# Patient Record
Sex: FEMALE | Race: WHITE | Employment: OTHER | URBAN - METROPOLITAN AREA
[De-identification: names, ages, dates, MRNs, and addresses within clinical notes are randomized per-mention and may not be internally consistent; named-entity substitution may affect disease eponyms.]

---

## 2018-11-27 ENCOUNTER — APPOINTMENT (OUTPATIENT)
Dept: GENERAL RADIOLOGY | Age: 63
DRG: 177 | End: 2018-11-27
Attending: EMERGENCY MEDICINE
Payer: MEDICARE

## 2018-11-27 ENCOUNTER — HOSPITAL ENCOUNTER (INPATIENT)
Age: 63
LOS: 9 days | Discharge: HOME | DRG: 177 | End: 2018-12-06
Attending: EMERGENCY MEDICINE | Admitting: FAMILY MEDICINE
Payer: MEDICARE

## 2018-11-27 DIAGNOSIS — F79 MENTAL RETARDATION: ICD-10-CM

## 2018-11-27 DIAGNOSIS — G80.8 DIPLEGIC CEREBRAL PALSY (HCC): ICD-10-CM

## 2018-11-27 DIAGNOSIS — J69.0 ASPIRATION PNEUMONIA OF RIGHT LOWER LOBE DUE TO VOMIT (HCC): Primary | ICD-10-CM

## 2018-11-27 DIAGNOSIS — R09.02 HYPOXIC: ICD-10-CM

## 2018-11-27 PROBLEM — G80.9 CEREBRAL PALSY (HCC): Status: ACTIVE | Noted: 2018-11-27

## 2018-11-27 LAB
ALBUMIN SERPL-MCNC: 3 G/DL (ref 3.4–5)
ALBUMIN/GLOB SERPL: 0.7 {RATIO} (ref 0.8–1.7)
ALP SERPL-CCNC: 85 U/L (ref 45–117)
ALT SERPL-CCNC: 35 U/L (ref 13–56)
ANION GAP SERPL CALC-SCNC: 12 MMOL/L (ref 3–18)
ANION GAP SERPL CALC-SCNC: 9 MMOL/L (ref 3–18)
APPEARANCE UR: CLEAR
ARTERIAL PATENCY WRIST A: YES
AST SERPL-CCNC: 41 U/L (ref 15–37)
BASE EXCESS BLD CALC-SCNC: 1 MMOL/L
BASOPHILS # BLD: 0 K/UL (ref 0–0.06)
BASOPHILS # BLD: 0 K/UL (ref 0–0.1)
BASOPHILS NFR BLD: 0 % (ref 0–2)
BASOPHILS NFR BLD: 0 % (ref 0–3)
BDY SITE: ABNORMAL
BILIRUB SERPL-MCNC: 0.1 MG/DL (ref 0.2–1)
BILIRUB UR QL: NEGATIVE
BUN SERPL-MCNC: 11 MG/DL (ref 7–18)
BUN SERPL-MCNC: 14 MG/DL (ref 7–18)
BUN/CREAT SERPL: 19 (ref 12–20)
BUN/CREAT SERPL: 23 (ref 12–20)
CALCIUM SERPL-MCNC: 9 MG/DL (ref 8.5–10.1)
CALCIUM SERPL-MCNC: 9.6 MG/DL (ref 8.5–10.1)
CHLORIDE SERPL-SCNC: 104 MMOL/L (ref 100–108)
CHLORIDE SERPL-SCNC: 104 MMOL/L (ref 100–108)
CO2 SERPL-SCNC: 25 MMOL/L (ref 21–32)
CO2 SERPL-SCNC: 27 MMOL/L (ref 21–32)
COLOR UR: YELLOW
CREAT SERPL-MCNC: 0.59 MG/DL (ref 0.6–1.3)
CREAT SERPL-MCNC: 0.61 MG/DL (ref 0.6–1.3)
DIFFERENTIAL METHOD BLD: ABNORMAL
DIFFERENTIAL METHOD BLD: ABNORMAL
EOSINOPHIL # BLD: 0 K/UL (ref 0–0.4)
EOSINOPHIL # BLD: 0.1 K/UL (ref 0–0.4)
EOSINOPHIL NFR BLD: 0 % (ref 0–5)
EOSINOPHIL NFR BLD: 1 % (ref 0–5)
ERYTHROCYTE [DISTWIDTH] IN BLOOD BY AUTOMATED COUNT: 14.6 % (ref 11.6–14.5)
ERYTHROCYTE [DISTWIDTH] IN BLOOD BY AUTOMATED COUNT: 14.6 % (ref 11.6–14.5)
GAS FLOW.O2 O2 DELIVERY SYS: ABNORMAL L/MIN
GLOBULIN SER CALC-MCNC: 4.1 G/DL (ref 2–4)
GLUCOSE SERPL-MCNC: 124 MG/DL (ref 74–99)
GLUCOSE SERPL-MCNC: 98 MG/DL (ref 74–99)
GLUCOSE UR STRIP.AUTO-MCNC: NEGATIVE MG/DL
HCO3 BLD-SCNC: 25.1 MMOL/L (ref 22–26)
HCT VFR BLD AUTO: 39.8 % (ref 35–45)
HCT VFR BLD AUTO: 42.8 % (ref 35–45)
HGB BLD-MCNC: 13.1 G/DL (ref 12–16)
HGB BLD-MCNC: 14.1 G/DL (ref 12–16)
HGB UR QL STRIP: NEGATIVE
KETONES UR QL STRIP.AUTO: ABNORMAL MG/DL
LACTATE BLD-SCNC: 4.54 MMOL/L (ref 0.4–2)
LACTATE BLD-SCNC: 4.57 MMOL/L (ref 0.4–2)
LACTATE BLD-SCNC: 5.48 MMOL/L (ref 0.4–2)
LEUKOCYTE ESTERASE UR QL STRIP.AUTO: NEGATIVE
LYMPHOCYTES # BLD: 2.3 K/UL (ref 0.8–3.5)
LYMPHOCYTES # BLD: 3.2 K/UL (ref 0.9–3.6)
LYMPHOCYTES NFR BLD: 17 % (ref 20–51)
LYMPHOCYTES NFR BLD: 26 % (ref 21–52)
MCH RBC QN AUTO: 30.5 PG (ref 24–34)
MCH RBC QN AUTO: 30.7 PG (ref 24–34)
MCHC RBC AUTO-ENTMCNC: 32.9 G/DL (ref 31–37)
MCHC RBC AUTO-ENTMCNC: 32.9 G/DL (ref 31–37)
MCV RBC AUTO: 92.8 FL (ref 74–97)
MCV RBC AUTO: 93.2 FL (ref 74–97)
MONOCYTES # BLD: 1.4 K/UL (ref 0.05–1.2)
MONOCYTES # BLD: 1.4 K/UL (ref 0–1)
MONOCYTES NFR BLD: 10 % (ref 2–9)
MONOCYTES NFR BLD: 12 % (ref 3–10)
NEUTS BAND NFR BLD MANUAL: 12 % (ref 0–5)
NEUTS SEG # BLD: 7.6 K/UL (ref 1.8–8)
NEUTS SEG # BLD: 9.8 K/UL (ref 1.8–8)
NEUTS SEG NFR BLD: 60 % (ref 42–75)
NEUTS SEG NFR BLD: 61 % (ref 40–73)
NITRITE UR QL STRIP.AUTO: NEGATIVE
O2/TOTAL GAS SETTING VFR VENT: 21 %
OTHER CELLS NFR BLD MANUAL: 1 %
PCO2 BLD: 38.7 MMHG (ref 35–45)
PH BLD: 7.42 [PH] (ref 7.35–7.45)
PH UR STRIP: 5 [PH] (ref 5–8)
PLATELET # BLD AUTO: 265 K/UL (ref 135–420)
PLATELET # BLD AUTO: 281 K/UL (ref 135–420)
PLATELET COMMENTS,PCOM: ABNORMAL
PMV BLD AUTO: 9 FL (ref 9.2–11.8)
PMV BLD AUTO: 9.2 FL (ref 9.2–11.8)
PO2 BLD: 66 MMHG (ref 80–100)
POTASSIUM SERPL-SCNC: 4.1 MMOL/L (ref 3.5–5.5)
POTASSIUM SERPL-SCNC: 4.3 MMOL/L (ref 3.5–5.5)
PROT SERPL-MCNC: 7.1 G/DL (ref 6.4–8.2)
PROT UR STRIP-MCNC: NEGATIVE MG/DL
RBC # BLD AUTO: 4.29 M/UL (ref 4.2–5.3)
RBC # BLD AUTO: 4.59 M/UL (ref 4.2–5.3)
RBC MORPH BLD: ABNORMAL
SAO2 % BLD: 93 % (ref 92–97)
SERVICE CMNT-IMP: ABNORMAL
SODIUM SERPL-SCNC: 140 MMOL/L (ref 136–145)
SODIUM SERPL-SCNC: 141 MMOL/L (ref 136–145)
SP GR UR REFRACTOMETRY: 1.02 (ref 1–1.03)
SPECIMEN TYPE: ABNORMAL
TOTAL RESP. RATE, ITRR: 18
UROBILINOGEN UR QL STRIP.AUTO: 0.2 EU/DL (ref 0.2–1)
WBC # BLD AUTO: 12.4 K/UL (ref 4.6–13.2)
WBC # BLD AUTO: 13.6 K/UL (ref 4.6–13.2)

## 2018-11-27 PROCEDURE — 83605 ASSAY OF LACTIC ACID: CPT

## 2018-11-27 PROCEDURE — 36600 WITHDRAWAL OF ARTERIAL BLOOD: CPT

## 2018-11-27 PROCEDURE — 87086 URINE CULTURE/COLONY COUNT: CPT

## 2018-11-27 PROCEDURE — 74011000250 HC RX REV CODE- 250: Performed by: EMERGENCY MEDICINE

## 2018-11-27 PROCEDURE — 94762 N-INVAS EAR/PLS OXIMTRY CONT: CPT

## 2018-11-27 PROCEDURE — 74011250637 HC RX REV CODE- 250/637: Performed by: STUDENT IN AN ORGANIZED HEALTH CARE EDUCATION/TRAINING PROGRAM

## 2018-11-27 PROCEDURE — 85025 COMPLETE CBC W/AUTO DIFF WBC: CPT

## 2018-11-27 PROCEDURE — 77010033678 HC OXYGEN DAILY

## 2018-11-27 PROCEDURE — 81003 URINALYSIS AUTO W/O SCOPE: CPT

## 2018-11-27 PROCEDURE — 99285 EMERGENCY DEPT VISIT HI MDM: CPT

## 2018-11-27 PROCEDURE — 87040 BLOOD CULTURE FOR BACTERIA: CPT

## 2018-11-27 PROCEDURE — 71045 X-RAY EXAM CHEST 1 VIEW: CPT

## 2018-11-27 PROCEDURE — 82803 BLOOD GASES ANY COMBINATION: CPT

## 2018-11-27 PROCEDURE — 74011000258 HC RX REV CODE- 258: Performed by: EMERGENCY MEDICINE

## 2018-11-27 PROCEDURE — 65270000029 HC RM PRIVATE

## 2018-11-27 PROCEDURE — 36415 COLL VENOUS BLD VENIPUNCTURE: CPT

## 2018-11-27 PROCEDURE — 74011250636 HC RX REV CODE- 250/636: Performed by: EMERGENCY MEDICINE

## 2018-11-27 PROCEDURE — 93005 ELECTROCARDIOGRAM TRACING: CPT

## 2018-11-27 PROCEDURE — 80053 COMPREHEN METABOLIC PANEL: CPT

## 2018-11-27 PROCEDURE — 92526 ORAL FUNCTION THERAPY: CPT

## 2018-11-27 PROCEDURE — 74011250636 HC RX REV CODE- 250/636: Performed by: STUDENT IN AN ORGANIZED HEALTH CARE EDUCATION/TRAINING PROGRAM

## 2018-11-27 PROCEDURE — 92610 EVALUATE SWALLOWING FUNCTION: CPT

## 2018-11-27 RX ORDER — METRONIDAZOLE 7.5 MG/G
GEL TOPICAL DAILY
Status: DISCONTINUED | OUTPATIENT
Start: 2018-11-28 | End: 2018-12-06 | Stop reason: HOSPADM

## 2018-11-27 RX ORDER — GABAPENTIN 600 MG/1
TABLET ORAL 4 TIMES DAILY
Status: ON HOLD | COMMUNITY
End: 2020-06-02 | Stop reason: SDUPTHER

## 2018-11-27 RX ORDER — METRONIDAZOLE 7.5 MG/G
GEL TOPICAL DAILY
Status: ON HOLD | COMMUNITY
End: 2020-06-02 | Stop reason: SDUPTHER

## 2018-11-27 RX ORDER — POLYETHYLENE GLYCOL 3350 17 G/17G
17 POWDER, FOR SOLUTION ORAL DAILY
Status: ON HOLD | COMMUNITY
End: 2020-06-02 | Stop reason: SDUPTHER

## 2018-11-27 RX ORDER — ENOXAPARIN SODIUM 100 MG/ML
40 INJECTION SUBCUTANEOUS EVERY 24 HOURS
Status: DISCONTINUED | OUTPATIENT
Start: 2018-11-27 | End: 2018-12-06 | Stop reason: HOSPADM

## 2018-11-27 RX ORDER — GABAPENTIN 300 MG/1
600 CAPSULE ORAL 4 TIMES DAILY
Status: DISCONTINUED | OUTPATIENT
Start: 2018-11-27 | End: 2018-11-28

## 2018-11-27 RX ORDER — DIVALPROEX SODIUM 125 MG/1
1000 CAPSULE, COATED PELLETS ORAL
Status: ON HOLD | COMMUNITY
End: 2020-06-02 | Stop reason: SDUPTHER

## 2018-11-27 RX ORDER — SODIUM CHLORIDE 9 MG/ML
75 INJECTION, SOLUTION INTRAVENOUS CONTINUOUS
Status: DISCONTINUED | OUTPATIENT
Start: 2018-11-27 | End: 2018-11-28

## 2018-11-27 RX ORDER — LOPERAMIDE HYDROCHLORIDE 2 MG/1
2 CAPSULE ORAL AS NEEDED
COMMUNITY
End: 2020-06-02

## 2018-11-27 RX ORDER — POLYETHYLENE GLYCOL 3350 17 G/17G
17 POWDER, FOR SOLUTION ORAL DAILY
Status: DISCONTINUED | OUTPATIENT
Start: 2018-11-28 | End: 2018-12-06 | Stop reason: HOSPADM

## 2018-11-27 RX ORDER — RISPERIDONE 2 MG/1
2 TABLET, FILM COATED ORAL
Status: ON HOLD | COMMUNITY
End: 2020-06-02 | Stop reason: SDUPTHER

## 2018-11-27 RX ORDER — RISPERIDONE 2 MG/1
2 TABLET, FILM COATED ORAL
Status: DISCONTINUED | OUTPATIENT
Start: 2018-11-27 | End: 2018-12-06 | Stop reason: HOSPADM

## 2018-11-27 RX ORDER — LOPERAMIDE HYDROCHLORIDE 2 MG/1
2 CAPSULE ORAL AS NEEDED
Status: DISCONTINUED | OUTPATIENT
Start: 2018-11-27 | End: 2018-12-06 | Stop reason: HOSPADM

## 2018-11-27 RX ORDER — LORATADINE 10 MG/1
10 TABLET ORAL DAILY
Status: DISCONTINUED | OUTPATIENT
Start: 2018-11-28 | End: 2018-12-06 | Stop reason: HOSPADM

## 2018-11-27 RX ORDER — DIVALPROEX SODIUM 125 MG/1
1000 CAPSULE, COATED PELLETS ORAL
Status: DISCONTINUED | OUTPATIENT
Start: 2018-11-27 | End: 2018-12-06 | Stop reason: HOSPADM

## 2018-11-27 RX ORDER — IBUPROFEN 200 MG
200 TABLET ORAL
COMMUNITY
End: 2020-06-02

## 2018-11-27 RX ORDER — SODIUM CHLORIDE 0.9 % (FLUSH) 0.9 %
5-10 SYRINGE (ML) INJECTION AS NEEDED
Status: DISCONTINUED | OUTPATIENT
Start: 2018-11-27 | End: 2018-12-06 | Stop reason: HOSPADM

## 2018-11-27 RX ORDER — IBUPROFEN 200 MG
200 TABLET ORAL
Status: DISCONTINUED | OUTPATIENT
Start: 2018-11-27 | End: 2018-12-06 | Stop reason: HOSPADM

## 2018-11-27 RX ORDER — BACLOFEN 10 MG/1
20 TABLET ORAL EVERY 8 HOURS
Status: DISCONTINUED | OUTPATIENT
Start: 2018-11-27 | End: 2018-12-06 | Stop reason: HOSPADM

## 2018-11-27 RX ORDER — LORAZEPAM 0.5 MG/1
0.5 TABLET ORAL 2 TIMES DAILY
Status: DISCONTINUED | OUTPATIENT
Start: 2018-11-27 | End: 2018-12-06 | Stop reason: HOSPADM

## 2018-11-27 RX ADMIN — GABAPENTIN 600 MG: 300 CAPSULE ORAL at 18:12

## 2018-11-27 RX ADMIN — BACLOFEN 20 MG: 10 TABLET ORAL at 18:12

## 2018-11-27 RX ADMIN — SODIUM CHLORIDE 600 MG: 900 INJECTION, SOLUTION INTRAVENOUS at 18:00

## 2018-11-27 RX ADMIN — BACLOFEN 20 MG: 10 TABLET ORAL at 23:04

## 2018-11-27 RX ADMIN — SODIUM CHLORIDE 1000 ML: 900 INJECTION, SOLUTION INTRAVENOUS at 09:00

## 2018-11-27 RX ADMIN — SODIUM CHLORIDE 75 ML/HR: 900 INJECTION, SOLUTION INTRAVENOUS at 21:44

## 2018-11-27 RX ADMIN — SODIUM CHLORIDE 1000 ML: 900 INJECTION, SOLUTION INTRAVENOUS at 09:15

## 2018-11-27 RX ADMIN — GABAPENTIN 600 MG: 300 CAPSULE ORAL at 23:04

## 2018-11-27 RX ADMIN — DIVALPROEX SODIUM 1000 MG: 125 CAPSULE, COATED PELLETS ORAL at 23:04

## 2018-11-27 RX ADMIN — LORAZEPAM 0.5 MG: 0.5 TABLET ORAL at 18:11

## 2018-11-27 RX ADMIN — ENOXAPARIN SODIUM 40 MG: 100 INJECTION SUBCUTANEOUS at 18:15

## 2018-11-27 RX ADMIN — SODIUM CHLORIDE 600 MG: 900 INJECTION, SOLUTION INTRAVENOUS at 09:14

## 2018-11-27 NOTE — ED TRIAGE NOTES
Patient brought in by medic from Group home for choking x this morning during administration of her morning medication. As per medic, group member staff stated that patient began choking and turning blue. Patient was suctioned by staff and was able to get some contents from medication and yogurt up. Upon medics arrival, patient's O2 saturation was 89% on RA. Patient received duo-neb x 1, albuterol x 1 both via mask. Patient's O2 saturation was at 97% on RA after receiving treatments. Patient looks more comfortable upon arrival, skin color pink.

## 2018-11-27 NOTE — H&P
Admission History and Physical 
[facility] Patient: Terence Bravo MRN: [id number]  [facility]: [id number] YOB: 1955  Age: 70 y.o. Sex: female DOA: 11/27/2018 HPI:  
 
Terence Bravo is a 70 y.o. female with PMH of Non-verbal, Diplegic Cerebral Palsy, Seizure Disorder, Intellectual Disability, Hypothyroidism, and Chronic Constipation, now presenting with complaint of choking. Patient's caregiver stated that after the patient ate her breakfast around 6am, she began choking when her home medications were being administered. During the episode, patient was noted to turn blue in the face. EMS was called and they noted an Sp02 in the 80's, so she was brought to the ED. In the ED, patient was found to have lactic acidosis, hypotensive with a low of 70/55, hypoxic with a low of 88%, and tachycardic to 103. She also had a CXR showing bibasilar opacities with possible small pleural effusions. They started her on Sepsis protocol by giving fluids and starting Clindamycin. She was also placed on 4LNC. Review of Systems: Unable to obtain given patient's chronic condition. Past Medical History:  
Diagnosis Date  Elevated blood sugar 10/29/2010  Epilepsy ([address]  Herpes simplex   
 mukesh oral   
 Mental retardation, severe (I.Q. 20-34)  Spastic diplegia ([address]  Speech impairment Past Surgical History:  
Procedure Laterality Date  HX HYSTERECTOMY    
 laproscopic and supracervical  
 HX SALPINGO-OOPHORECTOMY    
 bilateral  
 
 
No family history on file. Social History Socioeconomic History  Marital status: SINGLE Spouse name: Not on file  Number of children: Not on file  Years of education: Not on file  Highest education level: Not on file Allergies Allergen Reactions  Fenahistine Dm Unknown (comments) Per care giver Prior to Admission Medications Prescriptions Last Dose Informant Patient Reported? Taking? Estradiol (DIVIGEL) 0.5 (0.1) mg (%) GlPk   Yes No  
Sig: by TransDERmal route. Apply to inner thigh @@ bedtime HOMEOPATHIC DRUGS (OSTEO-KATIE PO)   Yes No  
Sig: Take 500 mg by mouth two (2) times a day. baclofen (LIORESAL) 20 mg tablet Unknown at Unknown time  No No  
Sig: Take 1 Tab by mouth every eight (8) hours. benztropine (COGENTIN) 1 mg tablet   Yes No  
Sig: Take 1 mg by mouth three (3) times daily. clonazepam (KLONOPIN) 1 mg tablet   Yes No  
Sig: Take 1 mg by mouth daily. At noon   
divalproex SR (DEPAKOTE) 125 mg tablet Unknown at Unknown time  Yes No  
Sig: Take  by mouth. Take 8 caps po @ bedtime. fluticasone (FLONASE) 50 mcg/Actuation nasal spray   Yes No  
Si Sprays by Nasal route daily. Administer to right and left nostril.   
loratadine (CLARITIN) 10 mg tablet   Yes No  
Sig: Take 10 mg by mouth daily. lorazepam (ATIVAN) 0.5 mg tablet   Yes No  
Sig: Take 0.5 mg by mouth two (2) times a day. Facility-Administered Medications: None Physical Exam:  
 
Patient Vitals for the past 24 hrs: 
 Temp Pulse Resp BP SpO2  
18 1500  86 24 114/40 98 % 18 1445  85 25 105/65 100 % 18 1430  83 21 113/90 100 % 18 1415  84 19 106/59 100 % 18 1400  96 19 99/64 99 % 18 1345  93 19 96/70 100 % 18 1330  94 18 98/55 99 % 18 1315  98 17 96/63 98 % 18 1300  97 20 103/43 98 % 18 1245  100 16 102/61 98 % 18 1230  97 14 (!) 70/55 98 % 18 1215  (!) 103 24 (!) 88/56 91 % 18 1200  85 15 124/59 99 % 18 1145  94 17 109/53 100 % 18 1130  88 16 117/64 100 % 18 1115  97 18 130/43 98 % 18 1100  95 22 101/89 98 % 18 1045  71 21 98/75 100 % 18 0945   17    
18 0934     90 % 18 0837 97 °F (36.1 °C)      
18 0830  91 22 114/57 94 % 11/27/18 0827  88 21 114/83 92 % 11/27/18 0815  94 19 114/63 93 % 11/27/18 0800  93 19 108/89 92 % 11/27/18 0745  (!) 101 23 127/82 (!) 88 % 11/27/18 0742     93 % Physical Exam:  
General:  Alert and Responsive and in No acute distress. Tongue thrusting, chronic. Unable to follow commands. HEENT: Conjunctiva pink, sclera anicteric. EOMI. Pharynx moist, nonerythematous. Moist mucous membranes. Thyroid not enlarged, no nodules. No cervical, supraclavicular, occipital or submandibular lymphadenopathy. No other gross abnormalities present. CV:  RRR, no murmurs. No visible pulsations or thrills. RESP:  Unlabored breathing. Lungs clear to auscultation. No wheeze, rales, or rhonchi. Equal expansion bilaterally. ABD:  Soft, nontender, mildly distended. No hepatosplenomegaly. No suprapubic tenderness. MS:  Contorted lower extremities bilaterally. Mild atrophy. Neuro:  Unable to assess given patient's condition. Ext:  No edema. 2+ radial and dp pulses bilaterally. Skin:  No rashes, lesions, or ulcers. Good turgor. IMAGING:  
 
Xr Chest [facility] Result Date: 11/27/2018 Impression: 1. Bibasilar opacities with possible small pleural effusions. Recent Results (from the past 12 hour(s)) CBC WITH AUTOMATED DIFF Collection Time: 11/27/18  7:38 AM  
Result Value Ref Range WBC 12.4 4.6 - 13.2 K/uL  
 RBC 4.59 4.20 - 5.30 M/uL  
 HGB 14.1 12.0 - 16.0 g/dL HCT 42.8 35.0 - 45.0 % MCV 93.2 74.0 - 97.0 FL  
 MCH 30.7 24.0 - 34.0 PG  
 MCHC 32.9 31.0 - 37.0 g/dL  
 RDW 14.6 (H) 11.6 - 14.5 % PLATELET 891 510 - 362 K/uL MPV 9.2 9.2 - 11.8 FL  
 NEUTROPHILS 61 40 - 73 % LYMPHOCYTES 26 21 - 52 % MONOCYTES 12 (H) 3 - 10 % EOSINOPHILS 1 0 - 5 % BASOPHILS 0 0 - 2 %  
 ABS. NEUTROPHILS 7.6 1.8 - 8.0 K/UL  
 ABS. LYMPHOCYTES 3.2 0.9 - 3.6 K/UL  
 ABS. MONOCYTES 1.4 (H) 0.05 - 1.2 K/UL  
 ABS. EOSINOPHILS 0.1 0.0 - 0.4 K/UL ABS. BASOPHILS 0.0 0.0 - 0.1 K/UL  
 DF AUTOMATED METABOLIC PANEL, COMPREHENSIVE Collection Time: 11/27/18  7:38 AM  
Result Value Ref Range Sodium 141 136 - 145 mmol/L Potassium 4.1 3.5 - 5.5 mmol/L Chloride 104 100 - 108 mmol/L  
 CO2 25 21 - 32 mmol/L Anion gap 12 3.0 - 18 mmol/L Glucose 124 (H) 74 - 99 mg/dL BUN 14 7.0 - 18 MG/DL Creatinine 0.61 0.6 - 1.3 MG/DL  
 BUN/Creatinine ratio 23 (H) 12 - 20 GFR est AA >60 >60 ml/min/1.73m2 GFR est non-AA >60 >60 ml/min/1.73m2 Calcium 9.6 8.5 - 10.1 MG/DL Bilirubin, total 0.1 (L) 0.2 - 1.0 MG/DL  
 ALT (SGPT) 35 13 - 56 U/L  
 AST (SGOT) 41 (H) 15 - 37 U/L Alk. phosphatase 85 45 - 117 U/L Protein, total 7.1 6.4 - 8.2 g/dL Albumin 3.0 (L) 3.4 - 5.0 g/dL Globulin 4.1 (H) 2.0 - 4.0 g/dL A-G Ratio 0.7 (L) 0.8 - 1.7 POC G3 Collection Time: 11/27/18  7:41 AM  
Result Value Ref Range Device: ROOM AIR    
 FIO2 (POC) 21 % pH (POC) 7.421 7.35 - 7.45    
 pCO2 (POC) 38.7 35.0 - 45.0 MMHG  
 pO2 (POC) 66 (L) 80 - 100 MMHG  
 HCO3 (POC) 25.1 22 - 26 MMOL/L  
 sO2 (POC) 93 92 - 97 % Base excess (POC) 1 mmol/L Allens test (POC) YES Total resp. rate 18 Site RIGHT RADIAL Specimen type (POC) ARTERIAL Performed by [employer] POC LACTIC ACID Collection Time: 11/27/18  7:46 AM  
Result Value Ref Range Lactic Acid (POC) 5.48 (HH) 0.40 - 2.00 mmol/L  
URINALYSIS W/ RFLX MICROSCOPIC Collection Time: 11/27/18  8:38 AM  
Result Value Ref Range Color YELLOW Appearance CLEAR Specific gravity 1.020 1.005 - 1.030    
 pH (UA) 5.0 5.0 - 8.0 Protein NEGATIVE  NEG mg/dL Glucose NEGATIVE  NEG mg/dL Ketone TRACE (A) NEG mg/dL Bilirubin NEGATIVE  NEG Blood NEGATIVE  NEG Urobilinogen 0.2 0.2 - 1.0 EU/dL Nitrites NEGATIVE  NEG Leukocyte Esterase NEGATIVE  NEG    
EKG, 12 LEAD, INITIAL Collection Time: 11/27/18  9:48 AM  
Result Value Ref Range Ventricular Rate 95 BPM  
 Atrial Rate 95 BPM  
 P-R Interval 164 ms QRS Duration 78 ms Q-T Interval 354 ms QTC Calculation (Bezet) 444 ms Calculated P Axis 55 degrees Calculated R Axis 54 degrees Calculated T Axis 35 degrees Diagnosis Normal sinus rhythm Low voltage QRS Borderline ECG No previous ECGs available POC LACTIC ACID Collection Time: 11/27/18 11:24 AM  
Result Value Ref Range Lactic Acid (POC) 4.54 (HH) 0.40 - 2.00 mmol/L POC LACTIC ACID Collection Time: 11/27/18  2:38 PM  
Result Value Ref Range Lactic Acid (POC) 4.57 (HH) 0.40 - 2.00 mmol/L Assessment/Plan:  
70 y.o. female with PMH Non-verbal, Diplegic Cerebral Palsy, Seizure Disorder, Intellectual Disability, Hypothyroidism, and Chronic Constipation, now admitted with possible aspiration pneumonia. Aspiration with Concerns of Hemodynamic Instability: Patient had two low BP's on admission (88/56 and 70/55), decreased Sp02 to 88%, and tachycardia to 103. Her lactic acid of 5.48, which improved to 4.54 after starting fluids. Speech evaluation completed in ED.  
- Admit to medicine - Continue Clindamycin 600mg IV 
- NS @100ml/hr for BP control and hydration - NC with Sp02 goal >95% - F/U Blood Cultures - Diet: Puree/Thickened Honey - Fall/Aspiration Precautions Diplegic Cerebral Palsy/Intellectual Disability/Behavior Problem: Wheelchair Bound,  
- Continue home Risperidone 1mg TID 
- Continue home Baclofen 20mg QID 
- Continue home Gabapentin 600mg QID 
- Repositioning q1h to prevent bed sores Seizure Disorder: No recent episodes of seizures. - Continue home Valproic Acid Delayed Release Sprinkle 125 mg (8 capsules qhs) Hypothyroidism: Most recent TSH in March 2018 was wnl.  
- Continue home Levothyroxine 50 mcg daily Chronic Constipation:  
- Continue home Miralax Diet: Puree/Honey-Thick Liquid Diet DVT Prophylaxis: Lovenox Code Status: FULL 
 Point of Contact: Shannon Laws [phone] (Relationship: Supervisor at [employer] (RN)) Disposition and anticipated LOS: >2 midnights Anna Valenzuela. Shamika Godoy MD, PGY-1 
[address] Medicine 11/27/18 3:11 PM 
 
 
  
Senior Addendum to History and Physical 
[address] [place] 
  
  
Patient: Josy Raygzoa MRN: [id number]  CSN: [id number] YOB: 1955  Age: 70 y.o. Sex: female   
  
DOA: 11/27/2018 HPI:  
  
Josy Raygoza is a 70 y.o. female with PMH diplegic cerebral palsey, seizure disorder, intellectual disability, non verbal at baseline, hypothyroidism, chronic constipation now presenting with complaint of choking 
  
Per the patient's caregiver patient was taking her home medications prior to breakfast when she began to choke. She was \"blue in the face\". EMS was called and the patient was noted to have an oxygen saturation of 80% on room air, so she was brought to the ED.  
  
She has baseline non-verbal mental retardation and lives in group home 
  
ED Course:  
Lactic acid elevated Hypotensive improved with fluids 2L bolus Hypoxic improved with oxygen at 4L NC Tachycardic to 103 CXR with bibasilar opacities with possible small pleural effusions Clindamycin started 
  
  
HPI, ROS, PMH, PSH, Family Hx, Social Hx, Home medications, and allergies as above in intern H&P. Which has been reviewed in full. Additional comments to subjective history include: 
  
Physical Exam:  
  
Physical Exam: 
General:  Alert and Responsive, mild distress pulling at lines, which caregiver states is her baseline HEENT: Conjunctiva pink, sclera anicteric. PERRL. EOMI. Pharynx moist, nonerythematous. Moist mucous membranes. Thyroid not enlarged, no nodules. CV:  RRR, no murmurs. PMI not displaced. No visible pulsations or thrills. No carotid bruits. RESP:  Unlabored breathing. Coarse breath sound bilaterally in the bases. Equal expansion bilaterally. ABD:  Soft, nontender, moderately distended at baseline. Normoactive bowel sounds. MS: Contractures in the upper and lower extremities Neuro:  Awake. Tongue thrusting constantly which is baseline per caregiver. Occasionally tracks with eyes Ext:  No edema. 2+ radial and dp pulses bilaterally. Skin:  No rashes, lesions, or ulcers. Good turgor. 
  
Labs and imaging reviewed  
  
Assessment/Plan:  
70 y.o. female with PMHdiplegic cerebral palsey, seizure disorder, intellectual disability, non verbal at baseline, hypothyroidism, chronic constipation  , now admitted with concern for aspiration pneumonia.  
  
Concern for aspiration pneumonia with tachycardia and low BP -  Presented after episode of choking with turing blue, low O2 sats, tachycardia, and low BP. Improved on 4L O2 via nasal cannula. Lactic acidosis and low BP improved after fluid rehydration. Speech eval in the ED recommended puree and honey thickened liquids. CXR done in the ED showed bibasilar opacities with small pleural effusions. - admit to medicine - Continue IV abx with clindamycin - Maintenance fluids with NS @ 100 ccs/hr  
- O2 via NC per RT with O2 goal > 95% - F/U blood cultures - Diet puree with honey thickened liquids per RT 
- Fall and aspiration precautions - Soft restraints for agitation 
- DVT prophylaxis with lovenox  
  
Diplegic CP/Intellectual disability wheelchair bound and non-verbal at baseline - Continue home risperidone 1 mg TID, baclofen 20 mg QID, and gabapentin 600 mg QID 
- For immobility, frequent rolling and repositioning 
  
Seizure Disorder - per caregiver, well controlled on current regimen  
- Continue home depakote 125 mg delayed release sprinkle 8 capsules QHS 
  
  
Principal Problem: 
  Aspiration pneumonia ([address] (11/27/2018) 
  Active Problems: 
  Mental retardation (7/13/2010) 
  
  Cerebral palsy ([address] (11/27/2018) 
  
  Hypoxia (11/27/2018)   
  
  
 For additional problem list, assessment, and plan see intern note 
  
Cooper Medina MD 
11/27/2018, 2:02 PM

## 2018-11-27 NOTE — PROGRESS NOTES
Problem: Dysphagia (Adult) Goal: *Acute Goals and Plan of Care (Insert Text) Recommendations: 
Diet: Puree/Honey-thick liquid Meds: Crushed in puree Aspiration Precautions Oral Care TID Other: Patient must be fed using spoon, slow rate, small bites/sips Goals:  Patient will: 1. Tolerate PO trials with 0 s/s overt distress in 4/5 trials 2. Utilize compensatory swallow strategies/maneuvers (decrease bite/sip, size/rate, alt. liq/sol) with min cues in 4/5 trials 3. Complete an objective swallow study (i.e., MBSS) to assess swallow integrity, r/o aspiration, and determine of safest LRD, min A Outcome: Progressing Towards Goal 
 
Speech LAnguage Pathology bedside swallow  
evaluation & TREATMENT Patient: Yolanda Calle (77 y.o. female) Date: 11/27/2018 Primary Diagnosis: Aspiration pneumonia ([address] Hypoxia Mental retardation Cerebral palsy ([address] Aspiration pneumonia ([address] Hypoxia Mental retardation Cerebral palsy ([address] Aspiration pneumonia ([address] Precautions: Aspiration PLOF: Group home ASSESSMENT : 
Based on the objective data described below, the patient presents with moderate oropharyngeal dysphagia. Patient alert, nonverbal at baseline. Caregiver from group home at bedside provided history. Puree/honey-thick liquid at baseline. Reports pt vomited and choked following taking pills whole with HTL. Oral-motor exam revealed pt edentulous with incoordinated labial movement; however, all other structures grossly intact for mastication and deglutition. This day, accepted SLP-fed puree and honey-thick liquid via spoon. A-P transit, swallow initiation and hyolaryngeal elevation appear timely. 0 overt s/sx of aspiration noted across consistencies trialed. Recommend puree/honey-thick liquid diet with aspiration precautions. Patient must be fed using spoon, slow feeding rate, small bites and sips. Meds should be presented crushed in puree. Skilled therapy initiated; Educated caregiver on aspiration precautions and importance of compensatory swallow techniques to decrease aspiration risk (decrease rate of intake & sip/bite size, upright @HOB for all po intake and ~30 minutes after po); verbalized comprehension. SLP will follow as indicated. Patient will benefit from skilled intervention to address the above impairments. Patients rehabilitation potential is considered to be Guarded Factors which may influence rehabilitation potential include:  
[]            None noted [x]            Mental ability/status []            Medical condition []            Home/family situation and support systems [x]            Safety awareness 
[]            Pain tolerance/management []            Other: PLAN : 
Recommendations and Planned Interventions: 
Puree/HTL Frequency/Duration: Patient will be followed by speech-language pathology 1-2 times per day/4-7 days per week to address goals. Discharge Recommendations: To Be Determined SUBJECTIVE:  
Patient stated N/A. OBJECTIVE:  
 
Past Medical History:  
Diagnosis Date  Elevated blood sugar 10/29/2010  Ep[address]  Herpes simplex   
 mukesh oral   
 Mental retardation, severe (I.Q. 20-34)  Spastic diplegia ([facility]  Speech impairment Past Surgical History:  
Procedure Laterality Date  HX HYSTERECTOMY    
 laproscopic and supracervical  
 HX SALPINGO-OOPHORECTOMY    
 bilateral  
 
Prior Level of Function/Home Situation: Group home Diet prior to admission: Puree/HTL Current Diet:  Puree/HTL Cognitive and Communication Status: 
Neurologic State: Alert Orientation Level: Unable to verbalize Cognition: No command following Perception: Appears intact Perseveration: Perseverates during mobility Safety/Judgement: Lack of insight into deficits Oral Assessment: 
Oral Assessment Labial: Impaired coordination Dentition: Edentulous Oral Hygiene: Good Lingual: Macroglossia; Incoordinated Velum: Unable to visualize Mandible: No impairment P.O. Trials: 
Patient Position: CDU 78 Vocal quality prior to P.O.:   
Consistency Presented: Honey thick liquid;Puree How Presented: SLP-fed/presented;Spoon Bolus Acceptance: No impairment Bolus Formation/Control: Impaired Type of Impairment: Delayed;Mastication Propulsion: No impairment Oral Residue: None Initiation of Swallow: No impairment Laryngeal Elevation: Functional 
Aspiration Signs/Symptoms: None Pharyngeal Phase Characteristics: No impairment, issues, or problems Effective Modifications: Spoon Cues for Modifications: Moderate Oral Phase Severity: Moderate Pharyngeal Phase Severity : Moderate GCODESwallowing:  Swallow Current Status CL= 60-79%  Swallow Goal Status CK= 40-59% The severity rating is based on the following outcomes: ZACHARIAH Noms Swallow Level 3 Clinical Judgement PAIN: 
Start of Eval/Tx: 0 End of Eval/Tx: 0 After treatment:  
[]            Patient left in no apparent distress sitting up in chair 
[x]            Patient left in no apparent distress in bed 
[x]            Call bell left within reach 
[]            Nursing notified []            Family present [x]            Caregiver present 
[]            Bed alarm activated COMMUNICATION/EDUCATION:  
[x]            Safe swallowing guidelines; compensatory techniques. []            Patient/family have participated as able in goal setting and plan of care. []            Patient/family agree to work toward stated goals and plan of care. []            Patient understands intent and goals of therapy; neutral about participation. [x]            Patient unable to participate in goal setting/plan of care; educ ongoing with interdisciplinary staff [x]         Posted safety precautions in patient's room. Thank you for this referral. 
ALEJANDRINA Clark Time Calculation: 17 mins Evaluation Time: 9 minutes Treatment Time: 8 minutes

## 2018-11-27 NOTE — H&P
Senior Addendum to History and Physical 
[facility] Patient: Karlene Mejia MRN: [id number]  CSN: [id number] YOB: 1955  Age: 70 y.o. Sex: female DOA: 11/27/2018 HPI:  
 
Karlene Mejia is a 70 y.o. female with PMH diplegic cerebral palsey, seizure disorder, intellectual disability, non verbal at baseline, hypothyroidism, chronic constipation now presenting with complaint of choking Per the patient's caregiver patient was taking her home medications prior to breakfast when she began to choke. She was \"blue in the face\". EMS was called and the patient was noted to have an oxygen saturation of 80% on room air, so she was brought to the ED. She has baseline non-verbal mental retardation and lives in group home ED Course:  
Lactic acid elevated Hypotensive improved with fluids 2L bolus Hypoxic improved with oxygen at 4L NC Tachycardic to 103 CXR with bibasilar opacities with possible small pleural effusions Clindamycin started HPI, ROS, PMH, PSH, Family Hx, Social Hx, Home medications, and allergies as above in intern H&P. Which has been reviewed in full. Additional comments to subjective history include: 
 
Physical Exam:  
 
Physical Exam: 
General:  Alert and Responsive, mild distress pulling at lines, which caregiver states is her baseline HEENT: Conjunctiva pink, sclera anicteric. PERRL. EOMI. Pharynx moist, nonerythematous. Moist mucous membranes. Thyroid not enlarged, no nodules. CV:  RRR, no murmurs. PMI not displaced. No visible pulsations or thrills. No carotid bruits. RESP:  Unlabored breathing. Coarse breath sound bilaterally in the bases. Equal expansion bilaterally. ABD:  Soft, nontender, moderately distended at baseline. Normoactive bowel sounds. MS: Contractures in the upper and lower extremities Neuro:  Awake. Tongue thrusting constantly which is baseline per caregiver. Occasionally tracks with eyes Ext:  No edema. 2+ radial and dp pulses bilaterally. Skin:  No rashes, lesions, or ulcers. Good turgor. Labs and imaging reviewed Assessment/Plan:  
70 y.o. female with PMHdiplegic cerebral palsey, seizure disorder, intellectual disability, non verbal at baseline, hypothyroidism, chronic constipation  , now admitted with concern for aspiration pneumonia. Concern for aspiration pneumonia with tachycardia and low BP -  Presented after episode of choking with turing blue, low O2 sats, tachycardia, and low BP. Improved on 4L O2 via nasal cannula. Lactic acidosis and low BP improved after fluid rehydration. Speech eval in the ED recommended puree and honey thickened liquids. CXR done in the ED showed bibasilar opacities with small pleural effusions. - admit to medicine - Continue IV abx with clindamycin - Maintenance fluids with NS @ 100 ccs/hr  
- O2 via NC per RT with O2 goal > 95% - F/U blood cultures - Diet puree with honey thickened liquids per RT 
- Fall and aspiration precautions - Soft restraints for agitation 
- DVT prophylaxis with lovenox Diplegic CP/Intellectual disability wheelchair bound and non-verbal at baseline - Continue home risperidone 1 mg TID, baclofen 20 mg QID, and gabapentin 600 mg QID 
- For immobility, frequent rolling and repositioning Seizure Disorder - per caregiver, well controlled on current regimen  
- Continue home depakote 125 mg delayed release sprinkle 8 capsules QHS Principal Problem: 
  Aspiration pneumonia ([address] (11/27/2018) Active Problems: 
  Mental retardation (7/13/2010) Cerebral palsy ([address] (11/27/2018) Hypoxia (11/27/2018) For additional problem list, assessment, and plan see intern note Blele Rosario MD 
11/27/2018, 2:02 PM

## 2018-11-27 NOTE — ED PROVIDER NOTES
EMERGENCY DEPARTMENT HISTORY AND PHYSICAL EXAM 
 
Date: 11/27/2018 Patient Name: Yeni Ivy History of Presenting Illness Chief Complaint Patient presents with  Shortness of Breath History Provided By: Patient and EMS Chief Complaint: respiratory distress Duration: 1 Hours Timing:  Acute Location: respiratory Quality: NA Severity: Moderate Modifying Factors: appeared to aspirate at nursing facility Associated Symptoms: hypoxic Additional History (Context): Yeni Ivy is a 70 y.o. female with seizure and MR, non-verbal who presents with possible aspiration while eating this morning per EMS who responded to call out from group home call [facility]. Per staff report to EMS, pt turned blue. Was 89% upon arrival.  Given C-pap, nebs en route. Does not appear to struggle to breathe. Not on oxygen at home. PCP: JADON Koch Current Facility-Administered Medications Medication Dose Route Frequency Provider Last Rate Last Dose  sodium chloride (NS) flush 5-10 mL  5-10 mL IntraVENous PRN lIa Mckeon PA      
 clindamycin phosphate (CLEOCIN) 600 mg in 0.9% sodium chloride 100 mL IVPB  600 mg IntraVENous Q8H Ila Mckeon PA      
 sodium chloride 0.9 % bolus infusion 1,000 mL  1,000 mL IntraVENous ONCE Ila Mckeon PA Followed by  
 sodium chloride 0.9 % bolus infusion 1,000 mL  1,000 mL IntraVENous ONCE Ila Mckeon PA Followed by  
 sodium chloride 0.9 % bolus infusion 190 mL  190 mL IntraVENous ONCE Ila Mckeon PA Current Outpatient Medications Medication Sig Dispense Refill  fluticasone (FLONASE) 50 mcg/Actuation nasal spray 2 Sprays by Nasal route daily. Administer to nostril.  Estradiol (DIVIGEL) 0.5 (0.1) mg (%) GlPk by TransDERmal route. Apply to inner thigh @@ bedtime  baclofen (LIORESAL) 20 mg tablet Take 1 Tab by mouth every eight (8) hours. 30 Tab 4  HOMEOPATHIC DRUGS (OSTEO-KATIE PO) Take 500 mg by mouth two (2) times a day.  lorazepam (ATIVAN) 0.5 mg tablet Take 0.5 mg by mouth two (2) times a day.  risperidone (RISPERDAL) 1 mg tablet Take 1 mg by mouth three (3) times daily.  divalproex SR (DEPAKOTE) 125 mg tablet Take  by mouth. Take 8 caps po @ bedtime.  loratadine (CLARITIN) 10 mg tablet Take 10 mg by mouth daily.  benztropine (COGENTIN) 1 mg tablet Take 1 mg by mouth three (3) times daily.  clonazepam (KLONOPIN) 1 mg tablet Take 1 mg by mouth daily. At noon Past History Past Medical History: 
Past Medical History:  
Diagnosis Date  Elevated blood sugar 10/29/2010  Epilepsy ([facility]  Herpes simplex   
 mukesh oral   
 Mental retardation, severe (I.Q. 20-34)  Spastic diplegia ([facility]  Speech impairment Past Surgical History: 
Past Surgical History:  
Procedure Laterality Date  HX HYSTERECTOMY    
 laproscopic and supracervical  
 HX SALPINGO-OOPHORECTOMY    
 bilateral  
 
 
Family History: No family history on file. Social History: 
Social History Tobacco Use  Smoking status: Not on file Substance Use Topics  Alcohol use: Not on file  Drug use: Not on file Allergies: Allergies Allergen Reactions  Tequila Scanlon Unknown (comments) Per care giver Review of Systems Review of Systems Unable to perform ROS: Patient nonverbal  
Constitutional: Negative for fever. Respiratory: Positive for choking and shortness of breath. Psychiatric/Behavioral: Positive for confusion. Pulls at everything All other systems reviewed and are negative. All Other Systems Negative Physical Exam  
 
Vitals:  
 11/27/18 0827 11/27/18 0830 11/27/18 6902 11/27/18 1972 BP: 114/83 114/57 Pulse: 88 91 Resp: 21 22 Temp:   97 °F (36.1 °C) SpO2: 92% 94%  90% Weight: 73 kg (161 lb) Physical Exam  
 Constitutional: She is oriented to person, place, and time. She appears well-developed and well-nourished. HENT:  
Head: Normocephalic and atraumatic. Eyes: Pupils are equal, round, and reactive to light. Neck: No JVD present. No tracheal deviation present. No thyromegaly present. Cardiovascular: Normal rate, regular rhythm and normal heart sounds. Exam reveals no gallop and no friction rub. No murmur heard. Pulmonary/Chest: Effort normal. No stridor. No respiratory distress. She has no wheezes. She has rales. She exhibits no tenderness. Abdominal: Soft. She exhibits no distension and no mass. There is no tenderness. There is no rebound and no guarding. Musculoskeletal: She exhibits no edema or tenderness. Lymphadenopathy:  
  She has no cervical adenopathy. Neurological: She is alert and oriented to person, place, and time. Skin: Skin is warm and dry. No rash noted. No erythema. No pallor. Psychiatric: She has a normal mood and affect. Her behavior is normal. Thought content normal.  
Nursing note and vitals reviewed. Diagnostic Study Results Labs - Recent Results (from the past 12 hour(s)) CBC WITH AUTOMATED DIFF Collection Time: 11/27/18  7:38 AM  
Result Value Ref Range WBC 12.4 4.6 - 13.2 K/uL  
 RBC 4.59 4.20 - 5.30 M/uL  
 HGB 14.1 12.0 - 16.0 g/dL HCT 42.8 35.0 - 45.0 % MCV 93.2 74.0 - 97.0 FL  
 MCH 30.7 24.0 - 34.0 PG  
 MCHC 32.9 31.0 - 37.0 g/dL  
 RDW 14.6 (H) 11.6 - 14.5 % PLATELET 845 618 - 535 K/uL MPV 9.2 9.2 - 11.8 FL  
 NEUTROPHILS 61 40 - 73 % LYMPHOCYTES 26 21 - 52 % MONOCYTES 12 (H) 3 - 10 % EOSINOPHILS 1 0 - 5 % BASOPHILS 0 0 - 2 %  
 ABS. NEUTROPHILS 7.6 1.8 - 8.0 K/UL  
 ABS. LYMPHOCYTES 3.2 0.9 - 3.6 K/UL  
 ABS. MONOCYTES 1.4 (H) 0.05 - 1.2 K/UL  
 ABS. EOSINOPHILS 0.1 0.0 - 0.4 K/UL  
 ABS. BASOPHILS 0.0 0.0 - 0.1 K/UL  
 DF AUTOMATED METABOLIC PANEL, COMPREHENSIVE  Collection Time: 11/27/18  7:38 AM  
 Result Value Ref Range Sodium 141 136 - 145 mmol/L Potassium 4.1 3.5 - 5.5 mmol/L Chloride 104 100 - 108 mmol/L  
 CO2 25 21 - 32 mmol/L Anion gap 12 3.0 - 18 mmol/L Glucose 124 (H) 74 - 99 mg/dL BUN 14 7.0 - 18 MG/DL Creatinine 0.61 0.6 - 1.3 MG/DL  
 BUN/Creatinine ratio 23 (H) 12 - 20 GFR est AA >60 >60 ml/min/1.73m2 GFR est non-AA >60 >60 ml/min/1.73m2 Calcium 9.6 8.5 - 10.1 MG/DL Bilirubin, total 0.1 (L) 0.2 - 1.0 MG/DL  
 ALT (SGPT) 35 13 - 56 U/L  
 AST (SGOT) 41 (H) 15 - 37 U/L Alk. phosphatase 85 45 - 117 U/L Protein, total 7.1 6.4 - 8.2 g/dL Albumin 3.0 (L) 3.4 - 5.0 g/dL Globulin 4.1 (H) 2.0 - 4.0 g/dL A-G Ratio 0.7 (L) 0.8 - 1.7 POC G3 Collection Time: 11/27/18  7:41 AM  
Result Value Ref Range Device: ROOM AIR    
 FIO2 (POC) 21 % pH (POC) 7.421 7.35 - 7.45    
 pCO2 (POC) 38.7 35.0 - 45.0 MMHG  
 pO2 (POC) 66 (L) 80 - 100 MMHG  
 HCO3 (POC) 25.1 22 - 26 MMOL/L  
 sO2 (POC) 93 92 - 97 % Base excess (POC) 1 mmol/L Allens test (POC) YES Total resp. rate 18 Site RIGHT RADIAL Specimen type (POC) ARTERIAL Performed by Krunal Leon POC LACTIC ACID Collection Time: 11/27/18  7:46 AM  
Result Value Ref Range Lactic Acid (POC) 5.48 (HH) 0.40 - 2.00 mmol/L  
URINALYSIS W/ RFLX MICROSCOPIC Collection Time: 11/27/18  8:38 AM  
Result Value Ref Range Color YELLOW Appearance CLEAR Specific gravity 1.020 1.005 - 1.030    
 pH (UA) 5.0 5.0 - 8.0 Protein NEGATIVE  NEG mg/dL Glucose NEGATIVE  NEG mg/dL Ketone TRACE (A) NEG mg/dL Bilirubin NEGATIVE  NEG Blood NEGATIVE  NEG Urobilinogen 0.2 0.2 - 1.0 EU/dL Nitrites NEGATIVE  NEG Leukocyte Esterase NEGATIVE  NEG    
EKG, 12 LEAD, INITIAL Collection Time: 11/27/18  9:48 AM  
Result Value Ref Range Ventricular Rate 95 BPM  
 Atrial Rate 95 BPM  
 P-R Interval 164 ms QRS Duration 78 ms Q-T Interval 354 ms QTC Calculation (Bezet) 444 ms Calculated P Axis 55 degrees Calculated R Axis 54 degrees Calculated T Axis 35 degrees Diagnosis Normal sinus rhythm Low voltage QRS Borderline ECG No previous ECGs available Radiologic Studies -  
XR CHEST PORT Final Result CT Results  (Last 48 hours) None CXR Results  (Last 48 hours)  
          
 11/27/18 0823  XR CHEST PORT Final result Impression:  Impression: 1. Bibasilar opacities with possible small pleural effusions. Narrative:  EXAM: Chest Radiograph INDICATION: Shortness of breath TECHNIQUE: AP view of the chest  
   
COMPARISON: None. FINDINGS: No pneumothorax identified. Bibasilar opacities are noted. Suggestion  
of bibasilar effusions. Cardiac silhouette is prominent. The pulmonary  
vasculature is unremarkable. The osseous structures are unremarkable. Medical Decision Making I am the first provider for this patient. I reviewed the vital signs, available nursing notes, past medical history, past surgical history, family history and social history. Vital Signs-Reviewe Records Reviewed: Nursing Notes Procedures: 
Procedures Provider Notes (Medical Decision Making): lactic acid >>5. Sepsis bundle intiiated and clindamycin ordered as well as IVF. Hospitalist to admit. Aspiration pneumonia on CXR, RLL. admit. Pt's PCP is actually [facility]. Admit to [facility]. MED RECONCILIATION: 
Current Facility-Administered Medications Medication Dose Route Frequency  sodium chloride (NS) flush 5-10 mL  5-10 mL IntraVENous PRN  
 clindamycin phosphate (CLEOCIN) 600 mg in 0.9% sodium chloride 100 mL IVPB  600 mg IntraVENous Q8H  
 sodium chloride 0.9 % bolus infusion 1,000 mL  1,000 mL IntraVENous ONCE Followed by  
 sodium chloride 0.9 % bolus infusion 1,000 mL  1,000 mL IntraVENous ONCE  Followed by  
  sodium chloride 0.9 % bolus infusion 190 mL  190 mL IntraVENous ONCE Current Outpatient Medications Medication Sig  
 fluticasone (FLONASE) 50 mcg/Actuation nasal spray 2 Sprays by Nasal route daily. Administer to nostril.  Estradiol (DIVIGEL) 0.5 (0.1) mg (%) GlPk by TransDERmal route. Apply to inner thigh @@ bedtime  baclofen (LIORESAL) 20 mg tablet Take 1 Tab by mouth every eight (8) hours.  HOMEOPATHIC DRUGS (OSTEO-KATIE PO) Take 500 mg by mouth two (2) times a day.  lorazepam (ATIVAN) 0.5 mg tablet Take 0.5 mg by mouth two (2) times a day.  risperidone (RISPERDAL) 1 mg tablet Take 1 mg by mouth three (3) times daily.  divalproex SR (DEPAKOTE) 125 mg tablet Take  by mouth. Take 8 caps po @ bedtime.  loratadine (CLARITIN) 10 mg tablet Take 10 mg by mouth daily.  benztropine (COGENTIN) 1 mg tablet Take 1 mg by mouth three (3) times daily.  clonazepam (KLONOPIN) 1 mg tablet Take 1 mg by mouth daily. At noon Disposition: 
Admit 
 
9:01 AM 
I have spent 35 minutes of critical care time involved in lab review, consultations with specialist, family decision-making, and documentation. During this entire length of time I was immediately available to the patient. Critical Care: The reason for providing this level of medical care for this critically ill patient was due a critical illness that impaired one or more vital organ systems such that there was a high probability of imminent or life threatening deterioration in the patients condition. This care involved high complexity decision making to assess, manipulate, and support vital system functions, to treat this degreee vital organ system failure and to prevent further life threatening deterioration of the patients condition. Follow-up Information None Current Discharge Medication List  
  
 
 
 
Core Measures: 
 
Critical Care Time:  
Critical Care Time:  
 I have spent 35 minutes of critical care time involved in lab review, consultations with specialist, family decision-making, and documentation. During this entire length of time I was immediately available to the patient. 
  
Critical Care: The reason for providing this level of medical care for this critically ill patient was due a critical illness that impaired one or more vital organ systems such that there was a high probability of imminent or life threatening deterioration in the patients condition. This care involved high complexity decision making to assess, manipulate, and support vital system functions, to treat this degreee vital organ system failure and to prevent further life threatening deterioration of the patients condition. For Hospitalized Patients: 
 
1. Hospitalization Decision Time: The decision to hospitalize the patient was made by Dr. Gilbert Hilario, and myself at 9:00a 11/27/2018 Diagnosis Clinical Impression: 1. Aspiration pneumonia of right lower lobe due to vomit ([address] 2. Hypoxic 3. Mental retardation 4. Diplegic cerebral palsy ([address]

## 2018-11-27 NOTE — ED NOTES
TRANSFER - OUT REPORT: 
 
Verbal report given to Lea Valentin RN(name) on Jaxon Lainez  being transferred to [facility](unit) for routine progression of care Report consisted of patients Situation, Background, Assessment and  
Recommendations(SBAR). Information from the following report(s) SBAR, Kardex and MAR was reviewed with the receiving nurse. Lines:  
Peripheral IV 11/27/18 Left Hand (Active) Site Assessment Clean, dry, & intact 11/27/2018  9:27 AM  
Phlebitis Assessment 0 11/27/2018  9:27 AM  
Dressing Status Clean, dry, & intact 11/27/2018  9:27 AM  
Dressing Type Transparent 11/27/2018  9:27 AM  
Hub Color/Line Status Pink 11/27/2018  9:27 AM  
   
Peripheral IV 11/27/18 Right Hand (Active) Site Assessment Clean, dry, & intact 11/27/2018  9:28 AM  
Phlebitis Assessment 0 11/27/2018  9:28 AM  
Dressing Status Clean, dry, & intact 11/27/2018  9:28 AM  
Dressing Type Transparent 11/27/2018  9:28 AM  
Hub Color/Line Status Blue 11/27/2018  9:28 AM  
  
 
Opportunity for questions and clarification was provided. Patient transported with: 
 O2 @ 4 liters

## 2018-11-28 LAB
ANION GAP SERPL CALC-SCNC: 9 MMOL/L (ref 3–18)
ARTERIAL PATENCY WRIST A: YES
ATRIAL RATE: 95 BPM
BACTERIA SPEC CULT: NORMAL
BASE EXCESS BLD CALC-SCNC: 4 MMOL/L
BASOPHILS # BLD: 0 K/UL (ref 0–0.1)
BASOPHILS NFR BLD: 0 % (ref 0–2)
BDY SITE: ABNORMAL
BUN SERPL-MCNC: 12 MG/DL (ref 7–18)
BUN/CREAT SERPL: 26 (ref 12–20)
CALCIUM SERPL-MCNC: 8.9 MG/DL (ref 8.5–10.1)
CALCULATED P AXIS, ECG09: 55 DEGREES
CALCULATED R AXIS, ECG10: 54 DEGREES
CALCULATED T AXIS, ECG11: 35 DEGREES
CHLORIDE SERPL-SCNC: 106 MMOL/L (ref 100–108)
CO2 SERPL-SCNC: 28 MMOL/L (ref 21–32)
CREAT SERPL-MCNC: 0.46 MG/DL (ref 0.6–1.3)
DIAGNOSIS, 93000: NORMAL
DIFFERENTIAL METHOD BLD: ABNORMAL
EOSINOPHIL # BLD: 0.1 K/UL (ref 0–0.4)
EOSINOPHIL NFR BLD: 1 % (ref 0–5)
ERYTHROCYTE [DISTWIDTH] IN BLOOD BY AUTOMATED COUNT: 14.9 % (ref 11.6–14.5)
GAS FLOW.O2 O2 DELIVERY SYS: ABNORMAL L/MIN
GLUCOSE SERPL-MCNC: 77 MG/DL (ref 74–99)
HCO3 BLD-SCNC: 28.9 MMOL/L (ref 22–26)
HCT VFR BLD AUTO: 38.8 % (ref 35–45)
HGB BLD-MCNC: 12.6 G/DL (ref 12–16)
LYMPHOCYTES # BLD: 3.9 K/UL (ref 0.9–3.6)
LYMPHOCYTES NFR BLD: 30 % (ref 21–52)
MCH RBC QN AUTO: 30.3 PG (ref 24–34)
MCHC RBC AUTO-ENTMCNC: 32.5 G/DL (ref 31–37)
MCV RBC AUTO: 93.3 FL (ref 74–97)
MONOCYTES # BLD: 1.5 K/UL (ref 0.05–1.2)
MONOCYTES NFR BLD: 12 % (ref 3–10)
NEUTS SEG # BLD: 7.5 K/UL (ref 1.8–8)
NEUTS SEG NFR BLD: 57 % (ref 40–73)
O2/TOTAL GAS SETTING VFR VENT: 100 %
P-R INTERVAL, ECG05: 164 MS
PCO2 BLD: 44.5 MMHG (ref 35–45)
PH BLD: 7.42 [PH] (ref 7.35–7.45)
PLATELET # BLD AUTO: 285 K/UL (ref 135–420)
PMV BLD AUTO: 9.3 FL (ref 9.2–11.8)
PO2 BLD: 125 MMHG (ref 80–100)
POTASSIUM SERPL-SCNC: 4.1 MMOL/L (ref 3.5–5.5)
Q-T INTERVAL, ECG07: 354 MS
QRS DURATION, ECG06: 78 MS
QTC CALCULATION (BEZET), ECG08: 444 MS
RBC # BLD AUTO: 4.16 M/UL (ref 4.2–5.3)
SAO2 % BLD: 99 % (ref 92–97)
SERVICE CMNT-IMP: ABNORMAL
SERVICE CMNT-IMP: NORMAL
SODIUM SERPL-SCNC: 143 MMOL/L (ref 136–145)
SPECIMEN TYPE: ABNORMAL
TOTAL RESP. RATE, ITRR: 20
VENTRICULAR RATE, ECG03: 95 BPM
WBC # BLD AUTO: 13 K/UL (ref 4.6–13.2)

## 2018-11-28 PROCEDURE — 77030038269 HC DRN EXT URIN PURWCK BARD -A

## 2018-11-28 PROCEDURE — 36415 COLL VENOUS BLD VENIPUNCTURE: CPT

## 2018-11-28 PROCEDURE — 85025 COMPLETE CBC W/AUTO DIFF WBC: CPT

## 2018-11-28 PROCEDURE — 82803 BLOOD GASES ANY COMBINATION: CPT

## 2018-11-28 PROCEDURE — 65270000029 HC RM PRIVATE

## 2018-11-28 PROCEDURE — 74011000250 HC RX REV CODE- 250: Performed by: STUDENT IN AN ORGANIZED HEALTH CARE EDUCATION/TRAINING PROGRAM

## 2018-11-28 PROCEDURE — 36600 WITHDRAWAL OF ARTERIAL BLOOD: CPT

## 2018-11-28 PROCEDURE — 74011250637 HC RX REV CODE- 250/637: Performed by: FAMILY MEDICINE

## 2018-11-28 PROCEDURE — 74011250636 HC RX REV CODE- 250/636: Performed by: STUDENT IN AN ORGANIZED HEALTH CARE EDUCATION/TRAINING PROGRAM

## 2018-11-28 PROCEDURE — 74011250637 HC RX REV CODE- 250/637: Performed by: STUDENT IN AN ORGANIZED HEALTH CARE EDUCATION/TRAINING PROGRAM

## 2018-11-28 PROCEDURE — 74011000258 HC RX REV CODE- 258: Performed by: EMERGENCY MEDICINE

## 2018-11-28 PROCEDURE — 74011000250 HC RX REV CODE- 250: Performed by: EMERGENCY MEDICINE

## 2018-11-28 PROCEDURE — 80048 BASIC METABOLIC PNL TOTAL CA: CPT

## 2018-11-28 RX ORDER — GABAPENTIN 300 MG/1
600 CAPSULE ORAL 2 TIMES DAILY
Status: DISCONTINUED | OUTPATIENT
Start: 2018-11-28 | End: 2018-12-01

## 2018-11-28 RX ORDER — DEXTROSE MONOHYDRATE AND SODIUM CHLORIDE 5; .45 G/100ML; G/100ML
75 INJECTION, SOLUTION INTRAVENOUS CONTINUOUS
Status: DISCONTINUED | OUTPATIENT
Start: 2018-11-28 | End: 2018-11-30

## 2018-11-28 RX ORDER — GABAPENTIN 300 MG/1
600 CAPSULE ORAL 2 TIMES DAILY
Status: DISCONTINUED | OUTPATIENT
Start: 2018-11-28 | End: 2018-11-28

## 2018-11-28 RX ADMIN — GABAPENTIN 600 MG: 300 CAPSULE ORAL at 14:12

## 2018-11-28 RX ADMIN — RISPERIDONE 2 MG: 2 TABLET ORAL at 00:25

## 2018-11-28 RX ADMIN — BACLOFEN 20 MG: 10 TABLET ORAL at 14:12

## 2018-11-28 RX ADMIN — BACLOFEN 20 MG: 10 TABLET ORAL at 05:45

## 2018-11-28 RX ADMIN — GABAPENTIN 600 MG: 300 CAPSULE ORAL at 22:44

## 2018-11-28 RX ADMIN — ENOXAPARIN SODIUM 40 MG: 100 INJECTION SUBCUTANEOUS at 14:12

## 2018-11-28 RX ADMIN — SODIUM CHLORIDE 600 MG: 900 INJECTION, SOLUTION INTRAVENOUS at 17:46

## 2018-11-28 RX ADMIN — SODIUM CHLORIDE 600 MG: 900 INJECTION, SOLUTION INTRAVENOUS at 00:31

## 2018-11-28 RX ADMIN — METRONIDAZOLE: 7.5 GEL TOPICAL at 09:00

## 2018-11-28 RX ADMIN — BACLOFEN 20 MG: 10 TABLET ORAL at 22:44

## 2018-11-28 RX ADMIN — RISPERIDONE 2 MG: 2 TABLET ORAL at 22:00

## 2018-11-28 RX ADMIN — POLYETHYLENE GLYCOL 3350 17 G: 17 POWDER, FOR SOLUTION ORAL at 09:17

## 2018-11-28 RX ADMIN — LORATADINE 10 MG: 10 TABLET ORAL at 09:17

## 2018-11-28 RX ADMIN — DIVALPROEX SODIUM 1000 MG: 125 CAPSULE, COATED PELLETS ORAL at 22:44

## 2018-11-28 RX ADMIN — SODIUM CHLORIDE 600 MG: 900 INJECTION, SOLUTION INTRAVENOUS at 09:19

## 2018-11-28 NOTE — PROGRESS NOTES
Intern Progress Note [address] Patient: Abdullahi Clancy MRN: [id number]  CSN: [id number] YOB: 1955  Age: 70 y.o. Sex: female DOA: 11/27/2018 LOS:  LOS: 1 day Subjective:  
 
Acute events: Patient was found to be 88% at room air this morning and was difficult to arouse. When placed on NC, she satted 92% on 1-3L, 94% on 4L, and 95% on 5L. After 30 minute period, patient became more alert. Spoke with caregiver, and she stated that patient's gabapentin prescription is being adjusted. She stated that the patient has had previous episodes of lethargy due to the gabapentin and asked us to decrease the amount while inpatient. ROS limited given patient's chronic medical condition. Objective:  
  
Patient Vitals for the past 24 hrs: 
 Temp Pulse Resp BP SpO2  
11/28/18 0607 98 °F (36.7 °C) 65 15 114/83 93 % 11/28/18 0400 98 °F (36.7 °C) 89 20 101/55 96 % 11/27/18 2042 98.8 °F (37.1 °C) 87 20 108/69 94 % 11/27/18 1912 97.6 °F (36.4 °C) 97 20 110/66 98 % 11/27/18 1800    108/60 97 % 11/27/18 1745  (!) 102 21 125/63 97 % 11/27/18 1730  88 21 105/67 98 % 11/27/18 1715  98 21 95/65 97 % 11/27/18 1700  85 15 104/67 97 % 11/27/18 1645  91 21 108/57 98 % 11/27/18 1630  80 21 (!) 57/27 98 % 11/27/18 1615  90 23 114/84 99 % 11/27/18 1600  82 18 103/47 99 % 11/27/18 1545  82 19 (!) 88/38 99 % 11/27/18 1500  86 24 114/40 98 % 11/27/18 1445  85 25 105/65 100 % 11/27/18 1430  83 21 113/90 100 % 11/27/18 1415  84 19 106/59 100 % 11/27/18 1400  96 19 99/64 99 % 11/27/18 1345  93 19 96/70 100 % 11/27/18 1330  94 18 98/55 99 % 11/27/18 1315  98 17 96/63 98 % 11/27/18 1300  97 20 103/43 98 % 11/27/18 1245  100 16 102/61 98 % 11/27/18 1230  97 14 (!) 70/55 98 % 11/27/18 1215  (!) 103 24 (!) 88/56 91 % 11/27/18 1200  85 15 124/59 99 % 11/27/18 1145  94 17 109/53 100 % 11/27/18 1130  88 16 117/64 100 % 11/27/18 1115  97 18 130/43 98 % 11/27/18 1100  95 22 101/89 98 % 11/27/18 1045  71 21 98/75 100 % No intake or output data in the 24 hours ending 11/28/18 1014 Physical Exam:  
General:  Intially lethargic. Only responded to sternal rub. HEENT: Conjunctiva pink, sclera anicteric. EOMI. Pharynx moist, nonerythematous. Moist mucous membranes. Thyroid not enlarged, no nodules. No cervical, supraclavicular, occipital or submandibular lymphadenopathy. No other gross abnormalities present. CV:  RRR, no murmurs. No visible pulsations or thrills. RESP:  Unlabored breathing on 4LNC. Lungs clear to auscultation. No wheeze, rales, or rhonchi. Equal expansion bilaterally. ABD:  Soft, nontender, mildly distended. No hepatosplenomegaly. No suprapubic tenderness. MS:  Contorted lower extremities bilaterally. Mild atrophy. Neuro:  Unable to assess given patient's condition. Ext:  No edema. 2+ radial and dp pulses bilaterally. Skin:  No rashes, lesions, or ulcers. Good turgor. 
  
Lab/Data Reviewed: 
Recent Results (from the past 12 hour(s)) METABOLIC PANEL, BASIC Collection Time: 11/28/18  1:28 AM  
Result Value Ref Range Sodium 143 136 - 145 mmol/L Potassium 4.1 3.5 - 5.5 mmol/L Chloride 106 100 - 108 mmol/L  
 CO2 28 21 - 32 mmol/L Anion gap 9 3.0 - 18 mmol/L Glucose 77 74 - 99 mg/dL BUN 12 7.0 - 18 MG/DL Creatinine 0.46 (L) 0.6 - 1.3 MG/DL  
 BUN/Creatinine ratio 26 (H) 12 - 20 GFR est AA >60 >60 ml/min/1.73m2 GFR est non-AA >60 >60 ml/min/1.73m2 Calcium 8.9 8.5 - 10.1 MG/DL  
CBC WITH AUTOMATED DIFF Collection Time: 11/28/18  1:28 AM  
Result Value Ref Range WBC 13.0 4.6 - 13.2 K/uL  
 RBC 4.16 (L) 4.20 - 5.30 M/uL  
 HGB 12.6 12.0 - 16.0 g/dL HCT 38.8 35.0 - 45.0 % MCV 93.3 74.0 - 97.0 FL  
 MCH 30.3 24.0 - 34.0 PG  
 MCHC 32.5 31.0 - 37.0 g/dL  
 RDW 14.9 (H) 11.6 - 14.5 % PLATELET 017 265 - 870 K/uL MPV 9.3 9.2 - 11.8 FL  
 NEUTROPHILS 57 40 - 73 % LYMPHOCYTES 30 21 - 52 % MONOCYTES 12 (H) 3 - 10 % EOSINOPHILS 1 0 - 5 % BASOPHILS 0 0 - 2 %  
 ABS. NEUTROPHILS 7.5 1.8 - 8.0 K/UL  
 ABS. LYMPHOCYTES 3.9 (H) 0.9 - 3.6 K/UL  
 ABS. MONOCYTES 1.5 (H) 0.05 - 1.2 K/UL  
 ABS. EOSINOPHILS 0.1 0.0 - 0.4 K/UL  
 ABS. BASOPHILS 0.0 0.0 - 0.1 K/UL  
 DF AUTOMATED    
POC G3 Collection Time: 11/28/18  8:28 AM  
Result Value Ref Range Device: Non rebreather FIO2 (POC) 100 % pH (POC) 7.420 7.35 - 7.45    
 pCO2 (POC) 44.5 35.0 - 45.0 MMHG  
 pO2 (POC) 125 (H) 80 - 100 MMHG  
 HCO3 (POC) 28.9 (H) 22 - 26 MMOL/L  
 sO2 (POC) 99 (H) 92 - 97 % Base excess (POC) 4 mmol/L Allens test (POC) YES Total resp. rate 20 Site LEFT BRACHIAL Specimen type (POC) ARTERIAL Performed by Atul Deluna Scheduled Medications Reviewed: 
Current Facility-Administered Medications Medication Dose Route Frequency  gabapentin (NEURONTIN) capsule 600 mg  600 mg Oral BID  clindamycin phosphate (CLEOCIN) 600 mg in 0.9% sodium chloride 100 mL IVPB  600 mg IntraVENous Q8H  
 enoxaparin (LOVENOX) injection 40 mg  40 mg SubCUTAneous Q24H  
 baclofen (LIORESAL) tablet 20 mg  20 mg Oral Q8H  
 divalproex (DEPAKOTE SPRINKLE) capsule 1,000 mg  1,000 mg Oral QHS  loratadine (CLARITIN) tablet 10 mg  10 mg Oral DAILY  LORazepam (ATIVAN) tablet 0.5 mg  0.5 mg Oral BID  metroNIDAZOLE (METROGEL) 0.75 % gel   Topical DAILY  polyethylene glycol (MIRALAX) packet 17 g  17 g Oral DAILY  risperiDONE (RisperDAL) tablet 2 mg  2 mg Oral QHS  
 0.9% sodium chloride infusion  75 mL/hr IntraVENous CONTINUOUS Imaging, microbiology, and EKG/Telemetry: No results found.  
 
Assessment/Plan  
70 y.o. female with PMH Non-verbal, Diplegic Cerebral Palsy, Seizure Disorder, Intellectual Disability, Hypothyroidism, and Chronic Constipation, now admitted with possible aspiration pneumonia. 
  
Aspiration with Concerns of Hemodynamic Instability: Patient continues to need supplemental oxygen to keep Sp02 above 94%. ABG today was normal.  
- Continue Clindamycin 600mg IV 
- NS @100ml/hr for BP control and hydration - NC with Sp02 goal >95% - Wean 02 as tolerated - Blood Cultures Negative - Diet: Puree/Thickened Honey - Fall/Aspiration Precautions 
  
Diplegic Cerebral Palsy/Intellectual Disability/Behavior Problem: Wheelchair Bound,  
- Will change home Gabapentin to 600mg BID given lethargy this morning 
- Continue home Risperidone 1mg TID 
- Continue home Baclofen 20mg QID 
- Repositioning q1h to prevent bed sores 
  
Seizure Disorder: No recent episodes of seizures. - Continue home Valproic Acid Delayed Release Sprinkle 125 mg (8 capsules qhs) 
  
Hypothyroidism: Most recent TSH in March 2018 was wnl.  
- Continue home Levothyroxine 50 mcg daily 
  
Chronic Constipation:  
- Continue home Miralax 
  
Diet: Puree/Honey-Thick Liquid Diet DVT Prophylaxis: Lovenox Code Status: FULL Point of Contact: Johanny Miguel [phone] (Relationship: Supervisor at [employer] (RN)) 
  
Disposition and anticipated LOS: >2 midnights Ramon Marshall MD  
[address] PGY-1 
11/28/18 10:14 AM

## 2018-11-28 NOTE — PROGRESS NOTES
Problem: Pressure Injury - Risk of 
Goal: *Prevention of pressure injury Document Reza Scale and appropriate interventions in the flowsheet. Outcome: Progressing Towards Goal 
Pressure Injury Interventions: 
Sensory Interventions: Float heels, Keep linens dry and wrinkle-free, Minimize linen layers, Turn and reposition approx. every two hours (pillows and wedges if needed) Moisture Interventions: Check for incontinence Q2 hours and as needed Activity Interventions: Assess need for specialty bed Mobility Interventions: Turn and reposition approx. every two hours(pillow and wedges) Nutrition Interventions: Document food/fluid/supplement intake Friction and Shear Interventions: Lift sheet, Minimize layers Problem: Falls - Risk of 
Goal: *Absence of Falls Document [place] Fall Risk and appropriate interventions in the flowsheet. Outcome: Progressing Towards Goal 
Fall Risk Interventions: 
  
 
Mentation Interventions: Door open when patient unattended, More frequent rounding, Room close to nurse's station Medication Interventions: Bed/chair exit alarm Elimination Interventions: Toileting schedule/hourly rounds

## 2018-11-28 NOTE — ROUTINE PROCESS
Entered patient's room to assess patient, patient very lethargic and unable to arouse, even after sternal rub. Munira SOLIS, Dr. Noemi Pradhan and Dr. Yoli Pinedo at bedside. Oxygen saturation 88%, O2 applied at 5L via nasal cannula, sats increased to 95%. Respiratory called for ABG. While RT was drawing ABG, patient became more responsive and alert, and began pulling at her lines and tubes. Order for restraints renewed, soft wrist restraints applied. Patient sitting up in bed, alert and agitated. Nasal cannula on. No apparent distress at this time, will monitor.

## 2018-11-28 NOTE — PROGRESS NOTES
Reason for Admission:  Aspiration pneumonia ([address] Hypoxia Mental retardation Cerebral palsy ([address] Aspiration pneumonia ([address] Hypoxia Mental retardation Cerebral palsy ([address] Aspiration pneumonia ([address] RRAT Score:   5 Plan for utilizing home health:    none Likelihood of Readmission:   LOW Transition of Care Plan:         
 
 
Initial assessment completed with Melanie LAURENTXF([phone]) responsible party for pt by phone and emergency contact. This patient lives in a group home, [facility]. Per Melanie Trotter, pt has lived in group home for years and does not have any family. The patient has mental retardation and wheelchair bound. Ival Furnish is caregiver for patient. Per Melanie Trotter, the group home will be able to provide care when discharged. Patient is currently active with [facility] in [place]. The patient will need medical  transport  upon discharge. Currently, the discharge plan is to return to group home. Care Management Interventions PCP Verified by CM: Yes(per Jennifer Khan, pt sees PCP every 3 months.) Mode of Transport at Discharge: S Transition of Care Consult (CM Consult): Discharge Planning MyChart Signup: No 
Discharge Durable Medical Equipment: No 
Physical Therapy Consult: No 
Occupational Therapy Consult: No 
Speech Therapy Consult: Yes Current Support Network: Adult Group Home Confirm Follow Up Transport: Other (see comment)(Medical transport) Plan discussed with Pt/Family/Caregiver: Yes Discharge Location Discharge Placement: Group home

## 2018-11-28 NOTE — ROUTINE PROCESS
Assumed care at 1900. Pt is hemodynamically stable and afebrile. No s/sx of resp distress noted at this time. SpO2 94% on RA. Per order, restraints in place. HOB elevated to prevent aspiration. Pills crushed and added to pudding, tolerated without difficulty. Currently the pt is resting with eyes closed. Will continue to monitor and update POC accordingly.

## 2018-11-28 NOTE — ROUTINE PROCESS
Assumed care of patient. Bedside verbal report received from [place] [id number] including SBAR, MAR, and Kardex. Vitals within normal limits. Asleep, but arouses to voice. Patient in no apparent distress.

## 2018-11-29 ENCOUNTER — APPOINTMENT (OUTPATIENT)
Dept: GENERAL RADIOLOGY | Age: 63
DRG: 177 | End: 2018-11-29
Attending: STUDENT IN AN ORGANIZED HEALTH CARE EDUCATION/TRAINING PROGRAM
Payer: MEDICARE

## 2018-11-29 ENCOUNTER — APPOINTMENT (OUTPATIENT)
Dept: GENERAL RADIOLOGY | Age: 63
DRG: 177 | End: 2018-11-29
Attending: FAMILY MEDICINE
Payer: MEDICARE

## 2018-11-29 LAB
ANION GAP SERPL CALC-SCNC: 8 MMOL/L (ref 3–18)
BASOPHILS # BLD: 0 K/UL (ref 0–0.1)
BASOPHILS NFR BLD: 0 % (ref 0–2)
BUN SERPL-MCNC: 13 MG/DL (ref 7–18)
BUN/CREAT SERPL: 27 (ref 12–20)
CALCIUM SERPL-MCNC: 8.5 MG/DL (ref 8.5–10.1)
CHLORIDE SERPL-SCNC: 109 MMOL/L (ref 100–108)
CO2 SERPL-SCNC: 24 MMOL/L (ref 21–32)
CREAT SERPL-MCNC: 0.49 MG/DL (ref 0.6–1.3)
DIFFERENTIAL METHOD BLD: ABNORMAL
EOSINOPHIL # BLD: 0.2 K/UL (ref 0–0.4)
EOSINOPHIL NFR BLD: 2 % (ref 0–5)
ERYTHROCYTE [DISTWIDTH] IN BLOOD BY AUTOMATED COUNT: 15.2 % (ref 11.6–14.5)
GLUCOSE SERPL-MCNC: 90 MG/DL (ref 74–99)
HCT VFR BLD AUTO: 40.3 % (ref 35–45)
HGB BLD-MCNC: 13 G/DL (ref 12–16)
LYMPHOCYTES # BLD: 2.7 K/UL (ref 0.9–3.6)
LYMPHOCYTES NFR BLD: 33 % (ref 21–52)
MCH RBC QN AUTO: 30.2 PG (ref 24–34)
MCHC RBC AUTO-ENTMCNC: 32.3 G/DL (ref 31–37)
MCV RBC AUTO: 93.7 FL (ref 74–97)
MONOCYTES # BLD: 1 K/UL (ref 0.05–1.2)
MONOCYTES NFR BLD: 13 % (ref 3–10)
NEUTS SEG # BLD: 4.3 K/UL (ref 1.8–8)
NEUTS SEG NFR BLD: 52 % (ref 40–73)
PLATELET # BLD AUTO: 276 K/UL (ref 135–420)
PMV BLD AUTO: 9.4 FL (ref 9.2–11.8)
POTASSIUM SERPL-SCNC: 3.9 MMOL/L (ref 3.5–5.5)
RBC # BLD AUTO: 4.3 M/UL (ref 4.2–5.3)
SODIUM SERPL-SCNC: 141 MMOL/L (ref 136–145)
WBC # BLD AUTO: 8.1 K/UL (ref 4.6–13.2)

## 2018-11-29 PROCEDURE — 74011000258 HC RX REV CODE- 258: Performed by: EMERGENCY MEDICINE

## 2018-11-29 PROCEDURE — 36415 COLL VENOUS BLD VENIPUNCTURE: CPT

## 2018-11-29 PROCEDURE — 74011000255 HC RX REV CODE- 255: Performed by: FAMILY MEDICINE

## 2018-11-29 PROCEDURE — 74011250636 HC RX REV CODE- 250/636: Performed by: STUDENT IN AN ORGANIZED HEALTH CARE EDUCATION/TRAINING PROGRAM

## 2018-11-29 PROCEDURE — 77030038269 HC DRN EXT URIN PURWCK BARD -A

## 2018-11-29 PROCEDURE — 74011000258 HC RX REV CODE- 258: Performed by: STUDENT IN AN ORGANIZED HEALTH CARE EDUCATION/TRAINING PROGRAM

## 2018-11-29 PROCEDURE — 71045 X-RAY EXAM CHEST 1 VIEW: CPT

## 2018-11-29 PROCEDURE — 92611 MOTION FLUOROSCOPY/SWALLOW: CPT

## 2018-11-29 PROCEDURE — 74230 X-RAY XM SWLNG FUNCJ C+: CPT

## 2018-11-29 PROCEDURE — 74011250637 HC RX REV CODE- 250/637: Performed by: STUDENT IN AN ORGANIZED HEALTH CARE EDUCATION/TRAINING PROGRAM

## 2018-11-29 PROCEDURE — 74011250637 HC RX REV CODE- 250/637: Performed by: FAMILY MEDICINE

## 2018-11-29 PROCEDURE — 65270000029 HC RM PRIVATE

## 2018-11-29 PROCEDURE — 85025 COMPLETE CBC W/AUTO DIFF WBC: CPT

## 2018-11-29 PROCEDURE — 92526 ORAL FUNCTION THERAPY: CPT

## 2018-11-29 PROCEDURE — 74011000250 HC RX REV CODE- 250: Performed by: EMERGENCY MEDICINE

## 2018-11-29 PROCEDURE — 80048 BASIC METABOLIC PNL TOTAL CA: CPT

## 2018-11-29 RX ADMIN — BARIUM SULFATE 15 ML: 400 PASTE ORAL at 11:00

## 2018-11-29 RX ADMIN — LORAZEPAM 0.5 MG: 0.5 TABLET ORAL at 17:19

## 2018-11-29 RX ADMIN — BARIUM SULFATE 700 MG: 700 TABLET ORAL at 11:00

## 2018-11-29 RX ADMIN — SODIUM CHLORIDE 600 MG: 900 INJECTION, SOLUTION INTRAVENOUS at 20:50

## 2018-11-29 RX ADMIN — DEXTROSE MONOHYDRATE AND SODIUM CHLORIDE 75 ML/HR: 5; .45 INJECTION, SOLUTION INTRAVENOUS at 03:51

## 2018-11-29 RX ADMIN — BACLOFEN 20 MG: 10 TABLET ORAL at 21:26

## 2018-11-29 RX ADMIN — BACLOFEN 20 MG: 10 TABLET ORAL at 13:07

## 2018-11-29 RX ADMIN — SODIUM CHLORIDE 600 MG: 900 INJECTION, SOLUTION INTRAVENOUS at 00:46

## 2018-11-29 RX ADMIN — DIVALPROEX SODIUM 1000 MG: 125 CAPSULE, COATED PELLETS ORAL at 21:24

## 2018-11-29 RX ADMIN — BARIUM SULFATE 30 G: 960 POWDER, FOR SUSPENSION ORAL at 11:00

## 2018-11-29 RX ADMIN — METRONIDAZOLE: 7.5 GEL TOPICAL at 09:00

## 2018-11-29 RX ADMIN — BARIUM SULFATE 30 ML: 400 SUSPENSION ORAL at 11:00

## 2018-11-29 RX ADMIN — ENOXAPARIN SODIUM 40 MG: 100 INJECTION SUBCUTANEOUS at 13:08

## 2018-11-29 RX ADMIN — DEXTROSE MONOHYDRATE AND SODIUM CHLORIDE 75 ML/HR: 5; .45 INJECTION, SOLUTION INTRAVENOUS at 17:19

## 2018-11-29 RX ADMIN — SODIUM CHLORIDE 600 MG: 900 INJECTION, SOLUTION INTRAVENOUS at 11:23

## 2018-11-29 RX ADMIN — DEXTROSE MONOHYDRATE AND SODIUM CHLORIDE 75 ML/HR: 5; .45 INJECTION, SOLUTION INTRAVENOUS at 22:31

## 2018-11-29 RX ADMIN — GABAPENTIN 600 MG: 300 CAPSULE ORAL at 17:19

## 2018-11-29 RX ADMIN — RISPERIDONE 2 MG: 2 TABLET ORAL at 21:24

## 2018-11-29 NOTE — PROGRESS NOTES
PM Check Subjective: went to check on patient as there was concern for aspiration on nectar thick diet earlier. Patient has hx of intellectual disability and is non-verbal as baseline. She is in restraints but has pulled her gown down. RN at bedside to adjust restraints. Objective:   
Patient Vitals for the past 8 hrs: 
 Temp Pulse Resp BP SpO2  
11/28/18 1940 97.9 °F (36.6 °C) 96 18 123/68 96 % 11/28/18 1545 98.9 °F (37.2 °C) 84 17 95/69 95 % General: patient awake, alert, non-verbal, restraints on  
CV: RRR, no murmurs, rubs, or gallops Lungs: unlabored breathing, CTAB, no wheezes, rales, rhonchi  
Pulses: 2+ radial pulses b/l, 2+ dp pulse b/l 
 
AP Patient appears stable. Concern for aspiration this afternoon. Patient has been placed NPO. - continue NPO   
- continue clindamycin for aspiration PNA 
- continue with plan as outlined in daily progress note Chuck Heredia MD PGY-1  
[address]

## 2018-11-29 NOTE — ROUTINE PROCESS
Report given to OSMAR Alberto, including SBAR, MAR, and Kardex. Patient alert, vitals within normal limits, no apparent distress.

## 2018-11-29 NOTE — PROGRESS NOTES
Intern Progress Note [facility] Patient: Gilbert Poole MRN: [id number]  CSN: [id number] YOB: 1955  Age: 70 y.o. Sex: female DOA: 11/27/2018 LOS:  LOS: 2 days Subjective:  
 
Acute events: Patient did well overnight without supplemental oxygen. This morning, she was lively and at her baseline. ROS limited given patient's chronic medical condition. Objective:  
  
Patient Vitals for the past 24 hrs: 
 Temp Pulse Resp BP SpO2  
11/29/18 0606 98.3 °F (36.8 °C) 75 16 125/77 94 % 11/29/18 0321 97 °F (36.1 °C) 82 18 120/66 97 % 11/29/18 0005 97 °F (36.1 °C) 84 18 124/68 96 % 11/28/18 1940 97.9 °F (36.6 °C) 96 18 123/68 96 % 11/28/18 1545 98.9 °F (37.2 °C) 84 17 95/69 95 % 11/28/18 1249 99.2 °F (37.3 °C) (!) 106 16 112/68 92 % Intake/Output Summary (Last 24 hours) at 11/29/2018 1067 Last data filed at 11/28/2018 6253 Gross per 24 hour Intake 1960 ml Output 125 ml Net 1835 ml Physical Exam:  
General:  Alert and Responsive and in No acute distress. Tongue thrusting, chronic. Unable to follow commands. HEENT: Conjunctiva pink, sclera anicteric. EOMI. Pharynx moist, nonerythematous. Moist mucous membranes. Thyroid not enlarged, no nodules. No cervical, supraclavicular, occipital or submandibular lymphadenopathy. No other gross abnormalities present. CV:  RRR, no murmurs. No visible pulsations or thrills. RESP:  Unlabored breathing. Lungs clear to auscultation. No wheeze, rales, or rhonchi. Equal expansion bilaterally. ABD:  Soft, nontender, mildly distended. No hepatosplenomegaly. No suprapubic tenderness. MS:  Contorted lower extremities bilaterally. Mild atrophy. Neuro:  Unable to assess given patient's condition. Ext:  No edema. 2+ radial and dp pulses bilaterally. Skin:  No rashes, lesions, or ulcers. Good turgor. 
  
Lab/Data Reviewed: 
Recent Results (from the past 12 hour(s)) METABOLIC PANEL, BASIC Collection Time: 11/29/18  3:34 AM  
Result Value Ref Range Sodium 141 136 - 145 mmol/L Potassium 3.9 3.5 - 5.5 mmol/L Chloride 109 (H) 100 - 108 mmol/L  
 CO2 24 21 - 32 mmol/L Anion gap 8 3.0 - 18 mmol/L Glucose 90 74 - 99 mg/dL BUN 13 7.0 - 18 MG/DL Creatinine 0.49 (L) 0.6 - 1.3 MG/DL  
 BUN/Creatinine ratio 27 (H) 12 - 20 GFR est AA >60 >60 ml/min/1.73m2 GFR est non-AA >60 >60 ml/min/1.73m2 Calcium 8.5 8.5 - 10.1 MG/DL  
CBC WITH AUTOMATED DIFF Collection Time: 11/29/18  3:34 AM  
Result Value Ref Range WBC 8.1 4.6 - 13.2 K/uL  
 RBC 4.30 4.20 - 5.30 M/uL  
 HGB 13.0 12.0 - 16.0 g/dL HCT 40.3 35.0 - 45.0 % MCV 93.7 74.0 - 97.0 FL  
 MCH 30.2 24.0 - 34.0 PG  
 MCHC 32.3 31.0 - 37.0 g/dL  
 RDW 15.2 (H) 11.6 - 14.5 % PLATELET 166 182 - 766 K/uL MPV 9.4 9.2 - 11.8 FL  
 NEUTROPHILS 52 40 - 73 % LYMPHOCYTES 33 21 - 52 % MONOCYTES 13 (H) 3 - 10 % EOSINOPHILS 2 0 - 5 % BASOPHILS 0 0 - 2 %  
 ABS. NEUTROPHILS 4.3 1.8 - 8.0 K/UL  
 ABS. LYMPHOCYTES 2.7 0.9 - 3.6 K/UL  
 ABS. MONOCYTES 1.0 0.05 - 1.2 K/UL  
 ABS. EOSINOPHILS 0.2 0.0 - 0.4 K/UL  
 ABS. BASOPHILS 0.0 0.0 - 0.1 K/UL  
 DF AUTOMATED Scheduled Medications Reviewed: 
Current Facility-Administered Medications Medication Dose Route Frequency  dextrose 5 % - 0.45% NaCl infusion  75 mL/hr IntraVENous CONTINUOUS  
 gabapentin (NEURONTIN) capsule 600 mg  600 mg Oral BID  clindamycin phosphate (CLEOCIN) 600 mg in 0.9% sodium chloride 100 mL IVPB  600 mg IntraVENous Q8H  
 enoxaparin (LOVENOX) injection 40 mg  40 mg SubCUTAneous Q24H  
 baclofen (LIORESAL) tablet 20 mg  20 mg Oral Q8H  
 divalproex (DEPAKOTE SPRINKLE) capsule 1,000 mg  1,000 mg Oral QHS  loratadine (CLARITIN) tablet 10 mg  10 mg Oral DAILY  LORazepam (ATIVAN) tablet 0.5 mg  0.5 mg Oral BID  metroNIDAZOLE (METROGEL) 0.75 % gel   Topical DAILY  polyethylene glycol (MIRALAX) packet 17 g  17 g Oral DAILY  risperiDONE (RisperDAL) tablet 2 mg  2 mg Oral QHS Imaging, microbiology, and EKG/Telemetry: No results found. Assessment/Plan  
70 y.o. female with PMH Non-verbal, Diplegic Cerebral Palsy, Seizure Disorder, Intellectual Disability, Hypothyroidism, and Chronic Constipation, now admitted with possible aspiration pneumonia. 
  
Aspiration with Concerns of Hemodynamic Instability: Patient is doing better than on admission. She continues to aspirate so will consult GI and Speech for another evaluation.   
- Consult GI about possible G-tube - Consult Speech about re-evaluation with Barium Swallow - Repeat CXR 
- Continue Clindamycin 600mg IV 
- D5+1/2NS  
- NC with Sp02 goal >95% - Blood Cultures Negative - Diet: Puree/Thickened Honey - Fall/Aspiration Precautions 
  
Diplegic Cerebral Palsy/Intellectual Disability/Behavior Problem: Wheelchair Bound - Will change home Gabapentin to 600mg BID given lethargy this morning 
- Continue home Risperidone 1mg TID 
- Continue home Baclofen 20mg QID 
- Repositioning q1h to prevent bed sores 
  
Seizure Disorder: No recent episodes of seizures. No seizure-like activity while inpatient. - Continue home Valproic Acid Delayed Release Sprinkle 125 mg (8 capsules qhs) 
  
Hypothyroidism: Most recent TSH in March 2018 was wnl.  
- Continue home Levothyroxine 50 mcg daily 
  
Chronic Constipation:  
- Continue home Miralax 
  
Diet: Puree/Honey-Thick Liquid Diet DVT Prophylaxis: Lovenox Code Status: FULL Point of Contact: Vinicio Hernandez [phone] (Relationship: Supervisor at [employer] (RN)) 
  
Disposition and anticipated LOS: >2 midnights MD Alfredo Michele PGY-1 
11/29/18 10:14 AM

## 2018-11-29 NOTE — PROGRESS NOTES
Problem: Dysphagia (Adult) Goal: *Acute Goals and Plan of Care (Insert Text) Recommendations: 
Diet: Puree with thin liquids Meds: in puree Aspiration Precautions Oral Care TID Other: Assistance with PO, slow rate, small bites/sips Goals:  Patient will: 1. Tolerate PO trials with 0 s/s overt distress in 4/5 trials 2. Utilize compensatory swallow strategies/maneuvers (decrease bite/sip, size/rate, alt. liq/sol) with min cues in 4/5 trials 3. Complete an objective swallow study (i.e., MBSS) to assess swallow integrity, r/o aspiration, and determine of safest LRD, min A - met 11/29/18 Outcome: Progressing Towards Goal 
Speech Pathology Modified barium swallow Study/treatment Patient: Karlene Mejia (41 y.o. female) Date: 11/29/2018 Primary Diagnosis: Aspiration pneumonia ([address] Hypoxia Mental retardation Cerebral palsy ([address] Aspiration pneumonia ([address] Hypoxia Mental retardation Cerebral palsy ([address] Aspiration pneumonia ([address] Precautions: aspiration ASSESSMENT : 
Based on the objective data described below, the patient presents with mod oral dysphagia. She tolerated puree, honey-thick, nectar-thick, thin liquids via cup/sip, and 13 mm Ba pill whole in puree without aspiration/penetration events. Pt edentulous with incoordinated labial movement; however, all other structures grossly intact for mastication and deglutition. A-P transit, swallow initiation and hyolaryngeal elevation appear timely. Tongue base retraction, laryngeal elevation, and pharyngeal motility were functional/timely. Positive oropharyngeal clearance appreciated. Rec puree diet with thin liquids, aspiration precautions, oral care TID, and meds in puree. Pt will require assistance with PO. ST will continue to follow x 1-2 more visits to ensure safety of PO. Patient will benefit from skilled intervention to address the above impairments. Patients rehabilitation potential is considered to be Fair Factors which may influence rehabilitation potential include:  
[x]              Mental ability/status [x]              Medical condition PLAN : 
Recommendations and Planned Interventions: See above Frequency/Duration: Patient will be followed by speech-language pathology x 1-2 more visits to address goals. Discharge Recommendations: [address] and To Be Determined SUBJECTIVE:  
Patient aphonic throughout evaluation. OBJECTIVE:  
 
Past Medical History:  
Diagnosis Date  Elevated blood sugar 10/29/2010  Epile[facility]  Herpes simplex   
 mukesh oral   
 Mental retardation, severe (I.Q. 20-34)  Spastic diplegia ([facility]  Speech impairment Past Surgical History:  
Procedure Laterality Date  HX HYSTERECTOMY    
 laproscopic and supracervical  
 HX SALPINGO-OOPHORECTOMY    
 bilateral  
 
Prior Level of Function/Home Situation: see below Home Situation Home Environment: Group home [facility] One[facility] Story Residence: One story Living Alone: No 
Support Systems: Home care staff Patient Expects to be Discharged to[de-identified] Group home Current DME Used/Available at Home: Hospital bed, Shower chair, Wheelchair, Other (comment)(enrique lift) Diet prior to admission: puree with honey-thick Current Diet:  Puree with thin Radiologist:   
Film Views: Lateral;Fluoro Patient Position: 90 in fluoro chair Trial 1:  
Consistency Presented: Thin liquid; Nectar thick liquid;Honey thick liquid;Puree;Pill/Tablet How Presented: SLP-fed/presented;Cup/sip;Spoon;Straw Bolus Acceptance: Impaired Bolus Formation/Control: Impaired: Anterior;Delayed;Lip closure;Mastication Propulsion: Discoordination Oral Residue: None Initiation of Swallow: No impairment Timing: No impairment Penetration: None Aspiration/Timing: No evidence of aspiration Pharyngeal Clearance: No residue Effective Modifications: None Cues for Modifications: None Decreased Tongue Base Retraction?: No 
Laryngeal Elevation: WFL (within functional limits) Aspiration/Penetration Score: 1 (No penetration or aspiration-Contrast does not enter the airway) Pharyngeal Symmetry: Not assessed Pharyngeal-Esophageal Segment: No impairment Pharyngeal Dysfunction: None Oral Phase Severity: Moderate Pharyngeal Phase Severity: N/A Treatment Performed Following Evaluation: 
Training and education provided related to diet recs, safe swallowing strategies, and positioning. Pt unable to verbalize understanding; suspect limited carryover. GCODESwallowing:  Swallow Current Status CJ= 20-39%  Swallow Goal Status CI= 1-19% The severity rating is based on the following outcomes:   
8-point Penetration-Aspiration Scale: Score 1 Professional Judgment PAIN: 
Pt reports 0/10 pain or discomfort prior to MBS. Pt reports 0/10 pain or discomfort post MBS. COMMUNICATION/EDUCATION:  
[x]  Patient educated regarding MBS results and diet recommendations. [x]  Patient is unable to participate in goal setting and plan of care. Thank you for this referral. 
 
Sánchez Saravia M.S. CCC-SLP/L Speech-Language Pathologist

## 2018-11-29 NOTE — PROGRESS NOTES
visited patient, completed the initial Spiritual Assessment of the patient, and offered Pastoral Care, see flow sheets for interventions. Limited Pastoral support provided because patient was not verbal and did not respond to  visit. She was awake and  gave assurance of prayer. Chart reviewed. Chaplains will continue to follow and will provide pastoral care on an as needed/as requested basis. Sea Myrick MDiv. Board Certified [employer] [facility] [phone]

## 2018-11-29 NOTE — CONSULTS
[email or web address]  [phone]    GASTROENTEROLOGY CONSULT      Impression:   1. ? Dysphagia with aspiration pneumonia, passed MBS this afternoon  2. Cerebral Palsy  3. Intellectual disability  4. Seizure disorder     Plan:     1. No GI procedures planned at this time as patient was able to tolerate all thicknesses without aspiration/penetration events  2. Continue SLP services and present supportive care  3. Will sign off-Thank you for this consultation and the opportunity to participate in the care of this patient. Please do not hesitate to call with any questions or concerns, or should event occur that may necessitate additional GI evaluation. Chief Complaint: Non verbal      HPI:  Yeni Ivy is a 70 y.o. female who I am being asked to see in consultation for an opinion regarding dysphagia with possible aspiration pneumonia after eating 11/27 with respiratory distress. Patient non-verbal at baseline, history obtained from chart.  Seen by Speech Therapy today with modified barium swallow which was notable for toleration of puree, honey-thick, nectar-thick, thin liquids via cup/sip, and 13 mm Ba pill whole in puree without aspiration/penetration events    PMH:   Past Medical History:   Diagnosis Date    Elevated blood sugar 10/29/2010    Epilepsy (HCC)     Herpes simplex     mukesh oral     Mental retardation, severe (I.Q. 20-34)     Spastic diplegia (HCC)     Speech impairment        PSH:   Past Surgical History:   Procedure Laterality Date    HX HYSTERECTOMY      laproscopic and supracervical    HX SALPINGO-OOPHORECTOMY      bilateral       Social HX:   Social History     Socioeconomic History    Marital status: SINGLE     Spouse name: Not on file    Number of children: Not on file    Years of education: Not on file    Highest education level: Not on file   Social Needs    Financial resource strain: Not on file    Food insecurity - worry: Not on file    Food insecurity - inability: Not on file   [employer]e [employer] needs - medical: Not on file   [employer] needs - non-medical: Not on file   Occupational History    Not on file   Tobacco Use    Smoking status: Not on file   Substance and Sexual Activity    Alcohol use: Not on file    Drug use: Not on file    Sexual activity: Not on file   Other Topics Concern    Not on file   Social History Narrative    Not on file       FHX:   No family history on file. Allergy:   Allergies   Allergen Reactions    Fenahistine Dm Unknown (comments)     Per care giver       Patient Active Problem List   Diagnosis Code    Epile[facility] ([facility] G40.909    Mental retardation F79    Diplegic cerebral palsy ([facility] G80.8    HSV-1 (herpes simplex virus 1) infection B00.9    Elevated blood sugar R73.9    Constipation K59.00    Cerebral palsy ([facility]) G80.9    Aspiration pneumonia ([facility]) J69.0    Hypoxia R09.02       Home Medications:     Medications Prior to Admission   Medication Sig    risperiDONE (RISPERDAL) 2 mg tablet Take 2 mg by mouth nightly.  metroNIDAZOLE (METROGEL) 0.75 % topical gel Apply  to affected area daily.  polyethylene glycol (MIRALAX) 17 gram packet Take 17 g by mouth daily.  gabapentin (NEURONTIN) 600 mg tablet Take  by mouth four (4) times daily.  ibuprofen (MOTRIN) 200 mg tablet Take 200 mg by mouth every six (6) hours as needed for Pain.  divalproex (DEPAKOTE SPRINKLES) 125 mg capsule Take 1,000 mg by mouth nightly.  fluticasone (FLONASE) 50 mcg/Actuation nasal spray 2 Sprays by Nasal route daily. Administer to nostril.  baclofen (LIORESAL) 20 mg tablet Take 1 Tab by mouth every eight (8) hours.  lorazepam (ATIVAN) 0.5 mg tablet Take 0.5 mg by mouth two (2) times a day.  loratadine (CLARITIN) 10 mg tablet Take 10 mg by mouth daily.  loperamide (IMODIUM) 2 mg capsule Take 2 mg by mouth as needed for Diarrhea.  Estradiol (DIVIGEL) 0.5 (0.1) mg (%) GlPk by TransDERmal route.  Apply to inner thigh @@ bedtime     HOMEOPATHIC DRUGS (OSTEO-KATIE PO) Take 500 mg by mouth two (2) times a day. Review of Systems: unable to obtain due to mental status and non-verbal        Visit Vitals  /80 (BP 1 Location: Left leg, BP Patient Position: At rest)   Pulse 93   Temp 98.7 °F (37.1 °C)   Resp 16   Wt 82.2 kg (181 lb 3.2 oz)   SpO2 96%       Physical Assessment:     constitutional: appearance: well developed, well nourished, obese habitus, no deformities, in no acute distress. skin: inspection: no rashes, ulcers, icterus or other lesions; no clubbing or telangiectasias. eyes: inspection: normal conjunctivae and lids; no jaundice pupils: normal  ENMT: mouth: normal oral mucosa,lips and gums;  neck: thyroid: normal size, consistency and position; no masses or tenderness. respiratory: effort: normal chest excursion; no intercostal retraction or accessory muscle use. cardiovascular:  normal rhythm; no thrill or murmurs. abdominal: abdomen: normal consistency; no tenderness or masses. hernias: no hernias appreciated. liver: normal size and consistency. spleen: not palpable. rectal: hemoccult/guaiac: not performed. musculoskeletal:   normal range of motion; no pain, deformity or contracture. neurologic: cranial nerves: II-XII normal.   psychiatric: judgement/insight: within normal limits. memory: within normal limits for recent and remote events. mood and affect: no evidence of depression, anxiety or agitation. orientation: oriented to time, space and person.         Basic Metabolic Profile   Recent Labs     11/29/18  0334      K 3.9   *   CO2 24   BUN 13   GLU 90   CA 8.5         CBC w/Diff    Recent Labs     11/29/18  0334   WBC 8.1   RBC 4.30   HGB 13.0   HCT 40.3   MCV 93.7   MCH 30.2   MCHC 32.3   RDW 15.2*       Recent Labs     11/29/18  0334   GRANS 52   LYMPH 33   EOS 2        Hepatic Function   Recent Labs     11/27/18  0738   ALB 3.0*   TP 7.1   TBILI 0.1*   SGOT 41*   AP 85        Coags No results for input(s): PTP, INR, APTT in the last 72 hours. No lab exists for component: INREXT        Linda Gonzalez NP. [facility], [address] [place] [address]  Cell: [phone]  [email or web address]

## 2018-11-29 NOTE — PROGRESS NOTES
MBS completed with recs of puree diet and thin liquids, meds in puree. Full report to follow. Thank you for this referral. 
 
Lucrecia Valencia M.S. CCC-SLP/L Speech-Language Pathologist

## 2018-11-29 NOTE — PROGRESS NOTES
Orders received for re-eval as there is concern for aspiration with current recommended diet of puree with honey-thick liquids. Will order MBS to further assess oropharyngeal swallow fxn and r/o aspiration. Thank you for this referral. 
 
Vern Owens M.S. CCC-SLP/L Speech-Language Pathologist

## 2018-11-30 LAB
ANION GAP SERPL CALC-SCNC: 10 MMOL/L (ref 3–18)
BASOPHILS # BLD: 0 K/UL (ref 0–0.1)
BASOPHILS NFR BLD: 0 % (ref 0–2)
BUN SERPL-MCNC: 9 MG/DL (ref 7–18)
BUN/CREAT SERPL: 16 (ref 12–20)
CALCIUM SERPL-MCNC: 8.7 MG/DL (ref 8.5–10.1)
CHLORIDE SERPL-SCNC: 107 MMOL/L (ref 100–108)
CO2 SERPL-SCNC: 24 MMOL/L (ref 21–32)
CREAT SERPL-MCNC: 0.58 MG/DL (ref 0.6–1.3)
DIFFERENTIAL METHOD BLD: ABNORMAL
EOSINOPHIL # BLD: 0.1 K/UL (ref 0–0.4)
EOSINOPHIL NFR BLD: 1 % (ref 0–5)
ERYTHROCYTE [DISTWIDTH] IN BLOOD BY AUTOMATED COUNT: 15 % (ref 11.6–14.5)
GLUCOSE SERPL-MCNC: 115 MG/DL (ref 74–99)
HCT VFR BLD AUTO: 40.5 % (ref 35–45)
HGB BLD-MCNC: 13.3 G/DL (ref 12–16)
LYMPHOCYTES # BLD: 3 K/UL (ref 0.9–3.6)
LYMPHOCYTES NFR BLD: 28 % (ref 21–52)
MCH RBC QN AUTO: 30.2 PG (ref 24–34)
MCHC RBC AUTO-ENTMCNC: 32.8 G/DL (ref 31–37)
MCV RBC AUTO: 92 FL (ref 74–97)
MONOCYTES # BLD: 1.5 K/UL (ref 0.05–1.2)
MONOCYTES NFR BLD: 14 % (ref 3–10)
NEUTS SEG # BLD: 6 K/UL (ref 1.8–8)
NEUTS SEG NFR BLD: 57 % (ref 40–73)
PLATELET # BLD AUTO: 294 K/UL (ref 135–420)
PMV BLD AUTO: 9.3 FL (ref 9.2–11.8)
POTASSIUM SERPL-SCNC: 3.9 MMOL/L (ref 3.5–5.5)
RBC # BLD AUTO: 4.4 M/UL (ref 4.2–5.3)
SODIUM SERPL-SCNC: 141 MMOL/L (ref 136–145)
WBC # BLD AUTO: 10.7 K/UL (ref 4.6–13.2)

## 2018-11-30 PROCEDURE — 36415 COLL VENOUS BLD VENIPUNCTURE: CPT

## 2018-11-30 PROCEDURE — 74011250636 HC RX REV CODE- 250/636: Performed by: FAMILY MEDICINE

## 2018-11-30 PROCEDURE — 85025 COMPLETE CBC W/AUTO DIFF WBC: CPT

## 2018-11-30 PROCEDURE — 74011250637 HC RX REV CODE- 250/637: Performed by: STUDENT IN AN ORGANIZED HEALTH CARE EDUCATION/TRAINING PROGRAM

## 2018-11-30 PROCEDURE — 77030038269 HC DRN EXT URIN PURWCK BARD -A

## 2018-11-30 PROCEDURE — 74011250637 HC RX REV CODE- 250/637: Performed by: FAMILY MEDICINE

## 2018-11-30 PROCEDURE — 74011000258 HC RX REV CODE- 258: Performed by: EMERGENCY MEDICINE

## 2018-11-30 PROCEDURE — 74011000250 HC RX REV CODE- 250: Performed by: EMERGENCY MEDICINE

## 2018-11-30 PROCEDURE — 80048 BASIC METABOLIC PNL TOTAL CA: CPT

## 2018-11-30 PROCEDURE — 65270000029 HC RM PRIVATE

## 2018-11-30 PROCEDURE — 74011250636 HC RX REV CODE- 250/636: Performed by: STUDENT IN AN ORGANIZED HEALTH CARE EDUCATION/TRAINING PROGRAM

## 2018-11-30 RX ORDER — LEVOTHYROXINE SODIUM 50 UG/1
50 TABLET ORAL
Status: DISCONTINUED | OUTPATIENT
Start: 2018-12-01 | End: 2018-12-06 | Stop reason: HOSPADM

## 2018-11-30 RX ORDER — FUROSEMIDE 10 MG/ML
20 INJECTION INTRAMUSCULAR; INTRAVENOUS ONCE
Status: COMPLETED | OUTPATIENT
Start: 2018-11-30 | End: 2018-11-30

## 2018-11-30 RX ADMIN — LORAZEPAM 0.5 MG: 0.5 TABLET ORAL at 17:37

## 2018-11-30 RX ADMIN — SODIUM CHLORIDE 600 MG: 900 INJECTION, SOLUTION INTRAVENOUS at 11:51

## 2018-11-30 RX ADMIN — ENOXAPARIN SODIUM 40 MG: 100 INJECTION SUBCUTANEOUS at 14:17

## 2018-11-30 RX ADMIN — FUROSEMIDE 20 MG: 10 INJECTION, SOLUTION INTRAMUSCULAR; INTRAVENOUS at 10:56

## 2018-11-30 RX ADMIN — METRONIDAZOLE: 7.5 GEL TOPICAL at 09:00

## 2018-11-30 RX ADMIN — BACLOFEN 20 MG: 10 TABLET ORAL at 22:28

## 2018-11-30 RX ADMIN — BACLOFEN 20 MG: 10 TABLET ORAL at 14:17

## 2018-11-30 RX ADMIN — RISPERIDONE 2 MG: 2 TABLET ORAL at 22:28

## 2018-11-30 RX ADMIN — SODIUM CHLORIDE 600 MG: 900 INJECTION, SOLUTION INTRAVENOUS at 22:27

## 2018-11-30 RX ADMIN — SODIUM CHLORIDE 600 MG: 900 INJECTION, SOLUTION INTRAVENOUS at 04:10

## 2018-11-30 RX ADMIN — DIVALPROEX SODIUM 1000 MG: 125 CAPSULE, COATED PELLETS ORAL at 23:08

## 2018-11-30 RX ADMIN — BACLOFEN 20 MG: 10 TABLET ORAL at 05:33

## 2018-11-30 RX ADMIN — GABAPENTIN 600 MG: 300 CAPSULE ORAL at 17:37

## 2018-11-30 NOTE — PROGRESS NOTES
NUTRITION Nutrition Consult: General Nutrition Management & Supplements RECOMMENDATIONS / PLAN:  
 
- Continue a general, healthy diet, consistency per SLP recommendations. Monitor meal intake, diet tolerance and weight. 
- Continue RD inpatient monitoring and evaluation. NUTRITION INTERVENTIONS & DIAGNOSIS:  
 
[x] Meals/snacks: modified composition Nutrition Diagnosis: No nutrition diagnosis at this time. ASSESSMENT:  
 
Admitted after episode of choking and aspiration. SLP evaluation and MBS completed. Pt non-verbal, unable to obtain information from pt. Caregiver not present, attempted to call by phone, no answer. Meal intake has been good since admission and was good PTA per caregiver at previous visit. Pt obese, wheelchair bound, at high risk for pressure ulcers due to limited mobility. Pt not appropriate for nutrition education/counseling due to mentation; caregiver not present; per MD caregiver requested nutrition consult. Noted plan for discharge to group home. Addendum: Spoke with caregiver about weight management and general, healthy eating. Average po intake adequate to meet patients estimated nutritional needs:   [x] Yes     [] No   [] Unable to determine at this time Diet: DIET DYSPHAGIA PUREED (NDD1) Food Allergies: NKFA Current Appetite:   [x] Good     [] Fair     [] Poor     [] Other: 
Appetite/meal intake prior to admission:   [x] Good     [] Fair     [] Poor     [] Other: 
Feeding Limitations:  [x] Swallowing difficulty: SLP following    [] Chewing difficulty    [] Other: 
Current Meal Intake:  
Patient Vitals for the past 100 hrs: 
 % Diet Eaten 11/30/18 1209 100 % 11/29/18 1645 100 % 11/29/18 1145 100 % 11/28/18 1912 100 % 11/28/18 1250 100 % BM: 11/29 Skin Integrity: WDL Edema:   [] No     [x] Yes Pertinent Medications: Reviewed Recent Labs 11/30/18 
6411 11/29/18 
8456 11/28/18 
0128  141 143  
K 3.9 3.9 4.1  109* 106 CO2 24 24 28 * 90 77 BUN 9 13 12 CREA 0.58* 0.49* 0.46* CA 8.7 8.5 8.9 Intake/Output Summary (Last 24 hours) at 11/30/2018 1615 Last data filed at 11/30/2018 1209 Gross per 24 hour Intake 180 ml Output  Net 180 ml Anthropometrics: 
Ht Readings from Last 1 Encounters:  
11/30/18 4' 11\" (1.499 m) Last 3 Recorded Weights in this Encounter 11/28/18 1059 11/29/18 1122 11/30/18 1149 Weight: 74.8 kg (165 lb) 82.2 kg (181 lb 3.2 oz) 83.3 kg (183 lb 9.6 oz) Body mass index is 37.08 kg/m². Obese, class II Weight History: Caregiver denied changes in weight PTA Weight Metrics 11/30/2018 Weight 183 lb 9.6 oz BMI 37.08 kg/m2 Admitting Diagnosis: Aspiration pneumonia ([address] Hypoxia Mental retardation Cerebral palsy ([address] Aspiration pneumonia ([address] Hypoxia Mental retardation Cerebral palsy ([address] Aspiration pneumonia ([address] Pertinent PMHx: elevated blood sugar, constipation Education Needs:        [] None identified  [] Identified - Not appropriate at this time  [x]  Identified and addressed - refer to education log Learning Limitations:   [] None identified  [x] Identified: mental retardation, cerebral palsy Cultural, Jainism & ethnic food preferences:  [x] None identified    [] Identified and addressed ESTIMATED NUTRITION NEEDS:  
 
Calories: 9889-1008 kcal (MSJx1.2-1.3) based on  [x] Actual BW 83 kg     [] IBW Protein: 67-83 gm (0.8-1 gm/kg) based on  [x] Actual BW      [] IBW Fluid: 1 mL/kcal 
  
MONITORING & EVALUATION:  
 
Nutrition Goal(s): 1. Po intake of meals will meet >75% of patient estimated nutritional needs within the next 7 days. Outcome:  [] Met/Ongoing    []  Not Met    [x] New/Initial Goal  
 
Monitoring:   [x] Food and beverage intake   [x] Diet order   [x] Nutrition-focused physical findings   [x] Treatment/therapy   [] Weight   [] Enteral nutrition intake Previous Recommendations (for follow-up assessments only):     []   Implemented       []   Not Implemented (RD to address)      [] No Longer Appropriate     [] No Recommendation Made Discharge Planning: general, healthy diet, consistency per SLP [x] Participated in care planning, discharge planning, & interdisciplinary rounds as appropriate Miguel Ángel Cat RD, [facility] Pager: [phone]

## 2018-11-30 NOTE — PROGRESS NOTES
Intern Progress Note 120 [facility] Patient: Fransisco Boogie MRN: [id number]  CSN: [id number] YOB: 1955  Age: 70 y.o. Sex: female DOA: 11/27/2018 LOS:  LOS: 3 days Subjective:  
 
Acute events: Patient did well overnight without supplemental oxygen. This morning, she was lively and at her baseline. She passed her barium swallow yesterday and she has tolerated her meals without choking. ROS limited given patient's chronic medical condition. Objective:  
  
Patient Vitals for the past 24 hrs: 
 Temp Pulse Resp BP SpO2  
11/30/18 0557 97.4 °F (36.3 °C) 94 20 150/83 93 % 11/30/18 0405 100 °F (37.8 °C) 84 18 142/85 94 % 11/29/18 1950 (!) 100.6 °F (38.1 °C) 90 18 157/81 95 % 11/29/18 1512 99.8 °F (37.7 °C) 91 17 120/74 90 % 11/29/18 1122 98.7 °F (37.1 °C) 93 16 142/80 96 % Intake/Output Summary (Last 24 hours) at 11/30/2018 3772 Last data filed at 11/29/2018 1645 Gross per 24 hour Intake 360 ml Output  Net 360 ml Physical Exam:  
General:  Alert and Responsive and in No acute distress. Tongue thrusting, chronic. Unable to follow commands. HEENT: Conjunctiva pink, sclera anicteric. EOMI. Pharynx moist, nonerythematous. Moist mucous membranes. Thyroid not enlarged, no nodules. No cervical, supraclavicular, occipital or submandibular lymphadenopathy. No other gross abnormalities present. CV:  RRR, no murmurs. No visible pulsations or thrills. RESP:  Unlabored breathing. Lungs clear to auscultation. No wheeze, rales, or rhonchi. Equal expansion bilaterally. ABD:  Soft, nontender, mildly distended. No hepatosplenomegaly. No suprapubic tenderness. MS:  Contorted lower extremities bilaterally. Mild atrophy. Neuro:  Unable to assess given patient's condition. Ext:  No edema. 2+ radial and dp pulses bilaterally. Skin:  No rashes, lesions, or ulcers. Good turgor. 
  
Lab/Data Reviewed: Recent Results (from the past 12 hour(s)) METABOLIC PANEL, BASIC Collection Time: 11/30/18  4:20 AM  
Result Value Ref Range Sodium 141 136 - 145 mmol/L Potassium 3.9 3.5 - 5.5 mmol/L Chloride 107 100 - 108 mmol/L  
 CO2 24 21 - 32 mmol/L Anion gap 10 3.0 - 18 mmol/L Glucose 115 (H) 74 - 99 mg/dL BUN 9 7.0 - 18 MG/DL Creatinine 0.58 (L) 0.6 - 1.3 MG/DL  
 BUN/Creatinine ratio 16 12 - 20 GFR est AA >60 >60 ml/min/1.73m2 GFR est non-AA >60 >60 ml/min/1.73m2 Calcium 8.7 8.5 - 10.1 MG/DL  
CBC WITH AUTOMATED DIFF Collection Time: 11/30/18  4:20 AM  
Result Value Ref Range WBC 10.7 4.6 - 13.2 K/uL  
 RBC 4.40 4.20 - 5.30 M/uL  
 HGB 13.3 12.0 - 16.0 g/dL HCT 40.5 35.0 - 45.0 % MCV 92.0 74.0 - 97.0 FL  
 MCH 30.2 24.0 - 34.0 PG  
 MCHC 32.8 31.0 - 37.0 g/dL  
 RDW 15.0 (H) 11.6 - 14.5 % PLATELET 280 242 - 151 K/uL MPV 9.3 9.2 - 11.8 FL  
 NEUTROPHILS 57 40 - 73 % LYMPHOCYTES 28 21 - 52 % MONOCYTES 14 (H) 3 - 10 % EOSINOPHILS 1 0 - 5 % BASOPHILS 0 0 - 2 %  
 ABS. NEUTROPHILS 6.0 1.8 - 8.0 K/UL  
 ABS. LYMPHOCYTES 3.0 0.9 - 3.6 K/UL  
 ABS. MONOCYTES 1.5 (H) 0.05 - 1.2 K/UL  
 ABS. EOSINOPHILS 0.1 0.0 - 0.4 K/UL  
 ABS. BASOPHILS 0.0 0.0 - 0.1 K/UL  
 DF AUTOMATED Scheduled Medications Reviewed: 
Current Facility-Administered Medications Medication Dose Route Frequency  dextrose 5 % - 0.45% NaCl infusion  75 mL/hr IntraVENous CONTINUOUS  
 gabapentin (NEURONTIN) capsule 600 mg  600 mg Oral BID  clindamycin phosphate (CLEOCIN) 600 mg in 0.9% sodium chloride 100 mL IVPB  600 mg IntraVENous Q8H  
 enoxaparin (LOVENOX) injection 40 mg  40 mg SubCUTAneous Q24H  
 baclofen (LIORESAL) tablet 20 mg  20 mg Oral Q8H  
 divalproex (DEPAKOTE SPRINKLE) capsule 1,000 mg  1,000 mg Oral QHS  loratadine (CLARITIN) tablet 10 mg  10 mg Oral DAILY  LORazepam (ATIVAN) tablet 0.5 mg  0.5 mg Oral BID  
  metroNIDAZOLE (METROGEL) 0.75 % gel   Topical DAILY  polyethylene glycol (MIRALAX) packet 17 g  17 g Oral DAILY  risperiDONE (RisperDAL) tablet 2 mg  2 mg Oral QHS Imaging, microbiology, and EKG/Telemetry: Xr Chest Sngl V Result Date: 11/29/2018 Impression: 1. Mild bibasilar opacities and possible small pleural effusions. Xr Swallow Func Video Result Date: 11/29/2018 IMPRESSION: No penetration or aspiration with all tested consistencies. Please see speech pathologist report for additional details and recommendations. Assessment/Plan  
70 y.o. female with PMH Non-verbal, Diplegic Cerebral Palsy, Seizure Disorder, Intellectual Disability, Hypothyroidism, and Chronic Constipation, now admitted with possible aspiration pneumonia. 
  
Aspiration with Concerns of Hemodynamic Instability: Patient is doing better than on admission. She has not had another episode of aspiration since yesterday. No G-tube needed at this time. CXR similar to admission.  
- Continue Clindamycin 600mg IV 
- Discontinue D5+1/2NS  
- Blood Cultures Negative - Diet: Puree/Thickened Honey - Fall/Aspiration Precautions - Nutrition Consult 
  
Diplegic Cerebral Palsy/Intellectual Disability/Behavior Problem: Wheelchair Bound - Continue Gabapentin to 600mg BID  
- Continue home Risperidone 1mg TID 
- Continue home Baclofen 20mg QID 
- Repositioning q1h to prevent bed sores 
  
Seizure Disorder: No recent episodes of seizures. No seizure-like activity while inpatient. - Continue home Valproic Acid Delayed Release Sprinkle 125 mg (8 capsules qhs) 
  
Hypothyroidism: Most recent TSH in March 2018 was wnl.  
- Continue home Levothyroxine 50 mcg daily 
  
Chronic Constipation:  
- Continue home Miralax 
  
Diet: Puree/Honey-Thick Liquid Diet DVT Prophylaxis: Lovenox Code Status: FULL Point of Contact: Johanny Miguel [phone] (Relationship: Supervisor at [employer] (RN)) 
  
 Disposition and anticipated LOS: >2 midnights Rehan Celaya MD  
[address] [place] PGY-1 
11/30/18 10:14 AM

## 2018-11-30 NOTE — ROUTINE PROCESS
Bedside shift change report given to Erinn Freitas (oncoming nurse) by Cintia Wallace (offgoing nurse). Report included the following information SBAR.

## 2018-11-30 NOTE — PROGRESS NOTES
Spoke with Lisa Callahan per MD request concerning [possible discharge patient on Saturday. Would patient accepted on weekend to group home? [facility] confirmed that pt can be accepted at anytime. Notified MD. AQUILINO AllenN, RN Pager # [phone] Care Manager.

## 2018-11-30 NOTE — PROGRESS NOTES
Problem: Pressure Injury - Risk of 
Goal: *Prevention of pressure injury Document Reza Scale and appropriate interventions in the flowsheet. Outcome: Progressing Towards Goal 
Pressure Injury Interventions: 
Sensory Interventions: Check visual cues for pain Moisture Interventions: Absorbent underpads Activity Interventions: Pressure redistribution bed/mattress(bed type) Mobility Interventions: Turn and reposition approx. every two hours(pillow and wedges) Nutrition Interventions: Offer support with meals,snacks and hydration Friction and Shear Interventions: HOB 30 degrees or less, Lift sheet

## 2018-11-30 NOTE — CDMP QUERY
The medical record reflects the following: 
 
Risk: admitted with aspiration pneumonia Clinical Indicators: 
- 11/28 pn \"Had episode of drowsiness and hypoxia this am, seems to be back at her baseline\" - 11/27 - O2 sat 87 on room air Treatment: placed on 4 L NC Please clarify if this patient is being treated/managed for: 
 
=> Metabolic encephalopathy due to hypoxemia treated with oxygen 
=> Other Explanation of clinical findings 
=> Unable to Determine (no explanation of clinical findings) Please clarify and document your clinical opinion in the progress notes and discharge summary including the definitive and/or presumptive diagnosis, (suspected or probable), related to the above clinical findings. Please include clinical findings supporting your diagnosis. If you DECLINE this query or would like to communicate with iPayment, please utilize the \"iPayment message box\" at the TOP of the Progress Note on the right. Thank you, [id number] [facility], [place] [address]

## 2018-12-01 LAB
ANION GAP SERPL CALC-SCNC: 9 MMOL/L (ref 3–18)
BASOPHILS # BLD: 0 K/UL (ref 0–0.1)
BASOPHILS NFR BLD: 0 % (ref 0–2)
BUN SERPL-MCNC: 13 MG/DL (ref 7–18)
BUN/CREAT SERPL: 24 (ref 12–20)
CALCIUM SERPL-MCNC: 8.8 MG/DL (ref 8.5–10.1)
CHLORIDE SERPL-SCNC: 106 MMOL/L (ref 100–108)
CO2 SERPL-SCNC: 24 MMOL/L (ref 21–32)
CREAT SERPL-MCNC: 0.54 MG/DL (ref 0.6–1.3)
DIFFERENTIAL METHOD BLD: ABNORMAL
EOSINOPHIL # BLD: 0.2 K/UL (ref 0–0.4)
EOSINOPHIL NFR BLD: 2 % (ref 0–5)
ERYTHROCYTE [DISTWIDTH] IN BLOOD BY AUTOMATED COUNT: 15 % (ref 11.6–14.5)
GLUCOSE SERPL-MCNC: 99 MG/DL (ref 74–99)
HCT VFR BLD AUTO: 40.6 % (ref 35–45)
HGB BLD-MCNC: 13.6 G/DL (ref 12–16)
LYMPHOCYTES # BLD: 3.5 K/UL (ref 0.9–3.6)
LYMPHOCYTES NFR BLD: 31 % (ref 21–52)
MCH RBC QN AUTO: 30.5 PG (ref 24–34)
MCHC RBC AUTO-ENTMCNC: 33.5 G/DL (ref 31–37)
MCV RBC AUTO: 91 FL (ref 74–97)
MONOCYTES # BLD: 1.6 K/UL (ref 0.05–1.2)
MONOCYTES NFR BLD: 14 % (ref 3–10)
NEUTS SEG # BLD: 5.9 K/UL (ref 1.8–8)
NEUTS SEG NFR BLD: 53 % (ref 40–73)
PLATELET # BLD AUTO: 282 K/UL (ref 135–420)
PMV BLD AUTO: 9.2 FL (ref 9.2–11.8)
POTASSIUM SERPL-SCNC: 3.6 MMOL/L (ref 3.5–5.5)
RBC # BLD AUTO: 4.46 M/UL (ref 4.2–5.3)
SODIUM SERPL-SCNC: 139 MMOL/L (ref 136–145)
WBC # BLD AUTO: 11.2 K/UL (ref 4.6–13.2)

## 2018-12-01 PROCEDURE — 74011250636 HC RX REV CODE- 250/636: Performed by: STUDENT IN AN ORGANIZED HEALTH CARE EDUCATION/TRAINING PROGRAM

## 2018-12-01 PROCEDURE — 74011000258 HC RX REV CODE- 258: Performed by: EMERGENCY MEDICINE

## 2018-12-01 PROCEDURE — 74011250637 HC RX REV CODE- 250/637: Performed by: FAMILY MEDICINE

## 2018-12-01 PROCEDURE — 74011000250 HC RX REV CODE- 250: Performed by: EMERGENCY MEDICINE

## 2018-12-01 PROCEDURE — 77030038269 HC DRN EXT URIN PURWCK BARD -A

## 2018-12-01 PROCEDURE — 74011250637 HC RX REV CODE- 250/637: Performed by: STUDENT IN AN ORGANIZED HEALTH CARE EDUCATION/TRAINING PROGRAM

## 2018-12-01 PROCEDURE — 77030011256 HC DRSG MEPILEX <16IN NO BORD MOLN -A

## 2018-12-01 PROCEDURE — 77030020186 HC BOOT HL PROTCT SAGE -B

## 2018-12-01 PROCEDURE — 80048 BASIC METABOLIC PNL TOTAL CA: CPT

## 2018-12-01 PROCEDURE — 77030037875 HC DRSG MEPILEX <16IN BORD MOLN -A

## 2018-12-01 PROCEDURE — 85025 COMPLETE CBC W/AUTO DIFF WBC: CPT

## 2018-12-01 PROCEDURE — 65270000029 HC RM PRIVATE

## 2018-12-01 PROCEDURE — 36415 COLL VENOUS BLD VENIPUNCTURE: CPT

## 2018-12-01 RX ADMIN — SODIUM CHLORIDE 600 MG: 900 INJECTION, SOLUTION INTRAVENOUS at 13:02

## 2018-12-01 RX ADMIN — BACLOFEN 20 MG: 10 TABLET ORAL at 05:00

## 2018-12-01 RX ADMIN — SODIUM CHLORIDE 600 MG: 900 INJECTION, SOLUTION INTRAVENOUS at 04:59

## 2018-12-01 RX ADMIN — LORATADINE 10 MG: 10 TABLET ORAL at 08:18

## 2018-12-01 RX ADMIN — DIVALPROEX SODIUM 1000 MG: 125 CAPSULE, COATED PELLETS ORAL at 21:55

## 2018-12-01 RX ADMIN — IBUPROFEN 200 MG: 200 TABLET, FILM COATED ORAL at 06:50

## 2018-12-01 RX ADMIN — METRONIDAZOLE: 7.5 GEL TOPICAL at 09:00

## 2018-12-01 RX ADMIN — LORAZEPAM 0.5 MG: 0.5 TABLET ORAL at 08:18

## 2018-12-01 RX ADMIN — RISPERIDONE 2 MG: 2 TABLET ORAL at 21:55

## 2018-12-01 RX ADMIN — LORAZEPAM 0.5 MG: 0.5 TABLET ORAL at 17:31

## 2018-12-01 RX ADMIN — ENOXAPARIN SODIUM 40 MG: 100 INJECTION SUBCUTANEOUS at 13:06

## 2018-12-01 RX ADMIN — BACLOFEN 20 MG: 10 TABLET ORAL at 21:55

## 2018-12-01 RX ADMIN — SODIUM CHLORIDE 600 MG: 900 INJECTION, SOLUTION INTRAVENOUS at 20:10

## 2018-12-01 RX ADMIN — GABAPENTIN 600 MG: 300 CAPSULE ORAL at 08:18

## 2018-12-01 RX ADMIN — LEVOTHYROXINE SODIUM 50 MCG: 50 TABLET ORAL at 05:01

## 2018-12-01 NOTE — PROGRESS NOTES
Intern Progress Note 120 [facility] Patient: Jade Godinez MRN: [id number]  CSN: [id number] YOB: 1955  Age: 70 y.o. Sex: female DOA: 11/27/2018 LOS:  LOS: 3 days Subjective:  
 
Acute events: No acute events overnight. PM check with no concerns and breathing was stable. Patient is non-verbal but nurse states no acute concerns. Patient based swallow study and no further concerns for aspiration at the moment. ROS limited given patient's chronic medical condition. Objective:  
  
Patient Vitals for the past 24 hrs: 
 Temp Pulse Resp BP SpO2  
11/30/18 1937 99.6 °F (37.6 °C) 99 20 121/71 94 % 11/30/18 1644 99.6 °F (37.6 °C) 100 20 128/84 96 % 11/30/18 1433  98     
11/30/18 1326 99.4 °F (37.4 °C) (!) 114 16 142/87 94 % 11/30/18 0557 97.4 °F (36.3 °C) 94 20 150/83 93 % 11/30/18 0405 100 °F (37.8 °C) 84 18 142/85 94 % Intake/Output Summary (Last 24 hours) at 11/30/2018 2328 Last data filed at 11/30/2018 1183 Gross per 24 hour Intake 60 ml Output 2300 ml Net -2240 ml Physical Exam:  
General:  Alert and non-verbal at baseline and in No acute distress. Unable to follow commands. CV:  RRR, no murmurs, rubs, or gallops RESP:  Unlabored breathing. Lungs clear to auscultation. No wheeze, rales, or rhonchi. ABD:  Soft, nontender, mildly distended. No hepatosplenomegaly. No suprapubic tenderness. MS:  Contorted lower extremities bilaterally. Neuro:  Unable to assess given patient's condition. Ext:  No edema. 2+ radial and dp pulses bilaterally. Skin:  No rashes, lesions, or ulcers.   
  
Lab/Data Reviewed: 
No results found for this or any previous visit (from the past 12 hour(s)). Scheduled Medications Reviewed: 
Current Facility-Administered Medications Medication Dose Route Frequency  gabapentin (NEURONTIN) capsule 600 mg  600 mg Oral BID  
  clindamycin phosphate (CLEOCIN) 600 mg in 0.9% sodium chloride 100 mL IVPB  600 mg IntraVENous Q8H  
 enoxaparin (LOVENOX) injection 40 mg  40 mg SubCUTAneous Q24H  
 baclofen (LIORESAL) tablet 20 mg  20 mg Oral Q8H  
 divalproex (DEPAKOTE SPRINKLE) capsule 1,000 mg  1,000 mg Oral QHS  loratadine (CLARITIN) tablet 10 mg  10 mg Oral DAILY  LORazepam (ATIVAN) tablet 0.5 mg  0.5 mg Oral BID  metroNIDAZOLE (METROGEL) 0.75 % gel   Topical DAILY  polyethylene glycol (MIRALAX) packet 17 g  17 g Oral DAILY  risperiDONE (RisperDAL) tablet 2 mg  2 mg Oral QHS Imaging, microbiology, and EKG/Telemetry: No results found. Assessment/Plan  
70 y.o. female with PMH Non-verbal, Diplegic Cerebral Palsy, Seizure Disorder, Intellectual Disability, Hypothyroidism, and Chronic Constipation, now admitted with possible aspiration pneumonia. 
  
Aspiration with Concerns of Hemodynamic Instability: Patient is doing better than on admission. She has not had another episode of aspiration. No G-tube needed at this time. CXR similar to admission.  
- Continue Clindamycin 600mg IV  
- Blood Cultures Negative - Diet: Puree - Fall/Aspiration Precautions - Nutrition Consult 
  
Diplegic Cerebral Palsy/Intellectual Disability/Behavior Problem: Wheelchair Bound - Continue Gabapentin to 600mg BID  
- Continue Risperidone 2 mg at bedtime  
- Continue home Baclofen 20mg QID 
- Repositioning q1h to prevent bed sores 
  
Seizure Disorder: No recent episodes of seizures. No seizure-like activity while inpatient. - Continue Valproic Acid Delayed Release Sprinkle 1000 mg at bedtime  
  
Hypothyroidism: Most recent TSH in March 2018 was wnl.  
- Continue home Levothyroxine 50 mcg daily 
  
Chronic Constipation:  
- Continue home Miralax 
  
Diet: Puree/Honey-Thick Liquid Diet DVT Prophylaxis: Lovenox Code Status: FULL Point of Contact: [facility] [phone] (Relationship: Supervisor at [employer] (RN))   
Disposition and anticipated LOS: >2 midnights Valentin López MD  
[address] Medicine PGY-1 
11/30/18 10:14 AM

## 2018-12-01 NOTE — PROGRESS NOTES
Bedside and Verbal shift change report given to Adamaris Lacy (oncoming nurse) by Naomy Gan (offgoing nurse). Report included the following information SBAR.

## 2018-12-01 NOTE — PROGRESS NOTES
Discharge update: 
 
Physician has decided to cancel the discharge for today due to medical reasons. Will attempt to discharge tomorrow (12/2/2018). Will continue to follow. Jim Olivier. [facility] Care Manager [phone]

## 2018-12-01 NOTE — ROUTINE PROCESS
Assumed care of patient. Bedside verbal report received from Cecy Gresham [address] RN including SBAR, MAR, and Kardex. Vitals within normal limits. Assessment completed, patient in no apparent distress.

## 2018-12-01 NOTE — PROGRESS NOTES
To bedside to assess patient She is asleep but awakens easily Per nursing she woke up and ate all her lunch Remains on 2L O2 NC PRN Plan to discharge tomorrow Gabapentin held due to somnolence after patient getting dose Carmen Scanlon MD 
12/01/18 
4:25 PM

## 2018-12-01 NOTE — PROGRESS NOTES
Alert, confused, no signs of distress, no sign of difficulty breathing, respiration unlabored,  nonverbal, incontinence care given and tolerated well, on restraints both wrist, no signs of injury, foam dressing applied to back. Patient resting on bed.

## 2018-12-01 NOTE — PROGRESS NOTES
Discharge planning: 
Possible discharge today. Confirmed with Néstor Shannon that pt can return today . Transport to [facility] has been arranged through [address] [place] [address],[address] at 4pm time. [place] [phone]. Lorna Cordoba requested skin assessment performed by RN. Notified Anitra Walker RN . MD is aware all medications for pt is po. Bedside RN has been updated to the plan. Please call report to [phone]. 
 
AQUILINO ConnorN, RN Pager # [phone] Care Manager

## 2018-12-01 NOTE — PROGRESS NOTES
Bedside and Verbal shift change report given to Stuart Aldridge RN (oncoming nurse) by Anton Rahman RN (offgoing nurse). Report included the following information SBAR, Kardex, Intake/Output and MAR.

## 2018-12-02 LAB
ANION GAP SERPL CALC-SCNC: 12 MMOL/L (ref 3–18)
BASOPHILS # BLD: 0 K/UL (ref 0–0.06)
BASOPHILS NFR BLD: 0 % (ref 0–3)
BUN SERPL-MCNC: 13 MG/DL (ref 7–18)
BUN/CREAT SERPL: 23 (ref 12–20)
CALCIUM SERPL-MCNC: 9.1 MG/DL (ref 8.5–10.1)
CHLORIDE SERPL-SCNC: 106 MMOL/L (ref 100–108)
CO2 SERPL-SCNC: 24 MMOL/L (ref 21–32)
CREAT SERPL-MCNC: 0.56 MG/DL (ref 0.6–1.3)
DIFFERENTIAL METHOD BLD: ABNORMAL
EOSINOPHIL # BLD: 0.1 K/UL (ref 0–0.4)
EOSINOPHIL NFR BLD: 1 % (ref 0–5)
ERYTHROCYTE [DISTWIDTH] IN BLOOD BY AUTOMATED COUNT: 14.7 % (ref 11.6–14.5)
GLUCOSE SERPL-MCNC: 103 MG/DL (ref 74–99)
HCT VFR BLD AUTO: 40.3 % (ref 35–45)
HGB BLD-MCNC: 13.3 G/DL (ref 12–16)
LYMPHOCYTES # BLD: 4.3 K/UL (ref 0.8–3.5)
LYMPHOCYTES NFR BLD: 39 % (ref 20–51)
MCH RBC QN AUTO: 30.6 PG (ref 24–34)
MCHC RBC AUTO-ENTMCNC: 33 G/DL (ref 31–37)
MCV RBC AUTO: 92.6 FL (ref 74–97)
MONOCYTES # BLD: 1.4 K/UL (ref 0–1)
MONOCYTES NFR BLD: 13 % (ref 2–9)
NEUTS BAND NFR BLD MANUAL: 1 % (ref 0–5)
NEUTS SEG # BLD: 5.1 K/UL (ref 1.8–8)
NEUTS SEG NFR BLD: 46 % (ref 42–75)
PLATELET # BLD AUTO: 284 K/UL (ref 135–420)
PLATELET COMMENTS,PCOM: ABNORMAL
PMV BLD AUTO: 8.9 FL (ref 9.2–11.8)
POTASSIUM SERPL-SCNC: 4 MMOL/L (ref 3.5–5.5)
RBC # BLD AUTO: 4.35 M/UL (ref 4.2–5.3)
RBC MORPH BLD: ABNORMAL
RBC MORPH BLD: ABNORMAL
SODIUM SERPL-SCNC: 142 MMOL/L (ref 136–145)
WBC # BLD AUTO: 10.9 K/UL (ref 4.6–13.2)

## 2018-12-02 PROCEDURE — 80048 BASIC METABOLIC PNL TOTAL CA: CPT

## 2018-12-02 PROCEDURE — 74011250637 HC RX REV CODE- 250/637: Performed by: STUDENT IN AN ORGANIZED HEALTH CARE EDUCATION/TRAINING PROGRAM

## 2018-12-02 PROCEDURE — 74011250636 HC RX REV CODE- 250/636: Performed by: STUDENT IN AN ORGANIZED HEALTH CARE EDUCATION/TRAINING PROGRAM

## 2018-12-02 PROCEDURE — 65270000029 HC RM PRIVATE

## 2018-12-02 PROCEDURE — 74011000258 HC RX REV CODE- 258: Performed by: EMERGENCY MEDICINE

## 2018-12-02 PROCEDURE — 74011000250 HC RX REV CODE- 250: Performed by: STUDENT IN AN ORGANIZED HEALTH CARE EDUCATION/TRAINING PROGRAM

## 2018-12-02 PROCEDURE — 85025 COMPLETE CBC W/AUTO DIFF WBC: CPT

## 2018-12-02 PROCEDURE — 74011000250 HC RX REV CODE- 250: Performed by: EMERGENCY MEDICINE

## 2018-12-02 PROCEDURE — 77030020186 HC BOOT HL PROTCT SAGE -B

## 2018-12-02 PROCEDURE — 36415 COLL VENOUS BLD VENIPUNCTURE: CPT

## 2018-12-02 PROCEDURE — 77030038269 HC DRN EXT URIN PURWCK BARD -A

## 2018-12-02 RX ORDER — CLINDAMYCIN HYDROCHLORIDE 150 MG/1
450 CAPSULE ORAL EVERY 8 HOURS
Status: DISCONTINUED | OUTPATIENT
Start: 2018-12-02 | End: 2018-12-06 | Stop reason: HOSPADM

## 2018-12-02 RX ADMIN — RISPERIDONE 2 MG: 2 TABLET ORAL at 22:11

## 2018-12-02 RX ADMIN — LORAZEPAM 0.5 MG: 0.5 TABLET ORAL at 18:13

## 2018-12-02 RX ADMIN — CLINDAMYCIN HYDROCHLORIDE 450 MG: 150 CAPSULE ORAL at 22:11

## 2018-12-02 RX ADMIN — BACLOFEN 20 MG: 10 TABLET ORAL at 22:12

## 2018-12-02 RX ADMIN — SODIUM CHLORIDE 600 MG: 900 INJECTION, SOLUTION INTRAVENOUS at 03:41

## 2018-12-02 RX ADMIN — IBUPROFEN 200 MG: 200 TABLET, FILM COATED ORAL at 04:25

## 2018-12-02 RX ADMIN — POLYETHYLENE GLYCOL 3350 17 G: 17 POWDER, FOR SOLUTION ORAL at 10:23

## 2018-12-02 RX ADMIN — BACLOFEN 20 MG: 10 TABLET ORAL at 13:17

## 2018-12-02 RX ADMIN — BACLOFEN 20 MG: 10 TABLET ORAL at 06:18

## 2018-12-02 RX ADMIN — LORAZEPAM 0.5 MG: 0.5 TABLET ORAL at 10:23

## 2018-12-02 RX ADMIN — ENOXAPARIN SODIUM 40 MG: 100 INJECTION SUBCUTANEOUS at 13:29

## 2018-12-02 RX ADMIN — METRONIDAZOLE: 7.5 GEL TOPICAL at 10:30

## 2018-12-02 RX ADMIN — DIVALPROEX SODIUM 1000 MG: 125 CAPSULE, COATED PELLETS ORAL at 22:06

## 2018-12-02 RX ADMIN — CLINDAMYCIN HYDROCHLORIDE 450 MG: 150 CAPSULE ORAL at 16:21

## 2018-12-02 RX ADMIN — LEVOTHYROXINE SODIUM 50 MCG: 50 TABLET ORAL at 06:18

## 2018-12-02 RX ADMIN — LORATADINE 10 MG: 10 TABLET ORAL at 10:23

## 2018-12-02 NOTE — PROGRESS NOTES
Brief Progress Note:  
Spoke with Michelle Joiner about oxygen delivery and patient discharge. She became upset because she did not have a choice in the oxygen delivery company. She asked if she could speak to someone about Ms. Lozano's care to date. I asked if she would like to speak with my senior resident, she declined and stated that she would like to speak to an . I let her know that we will contact her tomorrow in regards to Ms. Lozano's discharge and oxygen delivery status. Alexsander Viveros. Milady Brar MD, PGY-1 
[address] Medicine 12/02/18 4:16 PM

## 2018-12-02 NOTE — PROGRESS NOTES
Bedside and Verbal shift change report given to Anitra Walker RN (oncoming nurse) by Chris Perdomo RN (offgoing nurse). Report included the following information SBAR, Kardex, Intake/Output and MAR.

## 2018-12-02 NOTE — PROGRESS NOTES
0600 
Alert, NAD, bath given and tolerated well, foam/silicone dressing applied to back and both heels for pressure ulcer prevention. Suspension boots intact and in placed. 7052 Alert, NAD, v/s WNL, no signs of pain, breathing unlabored. Mews score 1. Visit Vitals /64 (BP 1 Location: Left arm, BP Patient Position: At rest) Pulse 96 Temp 98.9 °F (37.2 °C) Resp 20 Ht 4' 11\" (1.499 m) Wt 84.3 kg (185 lb 12.8 oz) SpO2 91% BMI 37.53 kg/m²

## 2018-12-02 NOTE — PROGRESS NOTES
Discharge planning: 
 
Patient's home O2 cannot be arranged for a delivery for today. They should be able to get the O2 for a delivery tomorrow 12/3/2018. Care manager will continue to closely monitor. Modesto Goldberg. [facility] Care Manager [phone]

## 2018-12-02 NOTE — PROGRESS NOTES
Intern Progress Note 120 [facility] Patient: Jade Godinez MRN: [id number]  CSN: [id number] YOB: 1955  Age: 70 y.o. Sex: female DOA: 11/27/2018 LOS:  LOS: 5 days Subjective:  
 
Acute events: Patient had no acute problems overnight. This morning, she continued to be hypoxic to 88% on RA while sleeping. When she became more awake, sp02 improved to 93%. ROS limited given patient's chronic medical condition. Objective:  
  
Patient Vitals for the past 24 hrs: 
 Temp Pulse Resp BP SpO2  
12/02/18 0710 98.8 °F (37.1 °C) 98 18 126/77 93 % 12/02/18 0648 98.9 °F (37.2 °C) 96 20 127/64 91 % 12/02/18 0424 99.8 °F (37.7 °C) (!) 103 22 107/71 91 % 12/01/18 1911 98.7 °F (37.1 °C) 99 20 128/84 94 % 12/01/18 1530 98.3 °F (36.8 °C) 100 24 123/81 93 % 12/01/18 1100 99.1 °F (37.3 °C) 85 24 113/73 94 % Intake/Output Summary (Last 24 hours) at 12/2/2018 1011 Last data filed at 12/2/2018 9880 Gross per 24 hour Intake 640 ml Output 1350 ml Net -710 ml Physical Exam:  
General:  Alert and non-verbal at baseline and in No acute distress. Unable to follow commands. CV:  RRR, no murmurs, rubs, or gallops RESP:  Unlabored breathing. Lungs clear to auscultation. No wheeze, rales, or rhonchi. ABD:  Soft, nontender, mildly distended. No hepatosplenomegaly. No suprapubic tenderness. MS:  Contorted lower extremities bilaterally. Neuro:  Unable to assess given patient's condition. Ext:  No edema. 2+ radial and dp pulses bilaterally. Skin:  No rashes, lesions, or ulcers.   
  
Lab/Data Reviewed: 
Recent Results (from the past 12 hour(s)) METABOLIC PANEL, BASIC Collection Time: 12/02/18  6:36 AM  
Result Value Ref Range Sodium 142 136 - 145 mmol/L Potassium 4.0 3.5 - 5.5 mmol/L Chloride 106 100 - 108 mmol/L  
 CO2 24 21 - 32 mmol/L Anion gap 12 3.0 - 18 mmol/L Glucose 103 (H) 74 - 99 mg/dL  BUN 13 7.0 - 18 MG/DL  
 Creatinine 0.56 (L) 0.6 - 1.3 MG/DL  
 BUN/Creatinine ratio 23 (H) 12 - 20 GFR est AA >60 >60 ml/min/1.73m2 GFR est non-AA >60 >60 ml/min/1.73m2 Calcium 9.1 8.5 - 10.1 MG/DL  
CBC WITH AUTOMATED DIFF Collection Time: 12/02/18  6:36 AM  
Result Value Ref Range WBC 10.9 4.6 - 13.2 K/uL  
 RBC 4.35 4.20 - 5.30 M/uL  
 HGB 13.3 12.0 - 16.0 g/dL HCT 40.3 35.0 - 45.0 % MCV 92.6 74.0 - 97.0 FL  
 MCH 30.6 24.0 - 34.0 PG  
 MCHC 33.0 31.0 - 37.0 g/dL  
 RDW 14.7 (H) 11.6 - 14.5 % PLATELET 034 653 - 353 K/uL MPV 8.9 (L) 9.2 - 11.8 FL  
 NEUTROPHILS 46 42 - 75 % BAND NEUTROPHILS 1 0 - 5 % LYMPHOCYTES 39 20 - 51 % MONOCYTES 13 (H) 2 - 9 % EOSINOPHILS 1 0 - 5 % BASOPHILS 0 0 - 3 %  
 ABS. NEUTROPHILS 5.1 1.8 - 8.0 K/UL  
 ABS. LYMPHOCYTES 4.3 (H) 0.8 - 3.5 K/UL  
 ABS. MONOCYTES 1.4 (H) 0 - 1.0 K/UL  
 ABS. EOSINOPHILS 0.1 0.0 - 0.4 K/UL  
 ABS. BASOPHILS 0.0 0.0 - 0.06 K/UL  
 DF MANUAL PLATELET COMMENTS ADEQUATE PLATELETS    
 RBC COMMENTS ANISOCYTOSIS 1+ 
    
 RBC COMMENTS POLYCHROMASIA 1+ Scheduled Medications Reviewed: 
Current Facility-Administered Medications Medication Dose Route Frequency  levothyroxine (SYNTHROID) tablet 50 mcg  50 mcg Oral 6am  
 clindamycin phosphate (CLEOCIN) 600 mg in 0.9% sodium chloride 100 mL IVPB  600 mg IntraVENous Q8H  
 enoxaparin (LOVENOX) injection 40 mg  40 mg SubCUTAneous Q24H  
 baclofen (LIORESAL) tablet 20 mg  20 mg Oral Q8H  
 divalproex (DEPAKOTE SPRINKLE) capsule 1,000 mg  1,000 mg Oral QHS  loratadine (CLARITIN) tablet 10 mg  10 mg Oral DAILY  LORazepam (ATIVAN) tablet 0.5 mg  0.5 mg Oral BID  metroNIDAZOLE (METROGEL) 0.75 % gel   Topical DAILY  polyethylene glycol (MIRALAX) packet 17 g  17 g Oral DAILY  risperiDONE (RisperDAL) tablet 2 mg  2 mg Oral QHS Imaging, microbiology, and EKG/Telemetry: No results found. Assessment/Plan 70 y.o. female with PMH Non-verbal, Diplegic Cerebral Palsy, Seizure Disorder, Intellectual Disability, Hypothyroidism, and Chronic Constipation, now admitted with possible aspiration pneumonia. 
  
Aspiration with Concerns of Hemodynamic Instability: Patient is doing better than on admission. She has not had another episode of aspiration. No G-tube needed at this time. CXR similar to admission.  
- Discontinue Clindamycin 600mg IV  
- Start oral Clindamycin today for a full 7 day treatment - Blood Cultures Negative - Diet: Puree - Fall/Aspiration Precautions 
  
Diplegic Cerebral Palsy/Intellectual Disability/Behavior Problem: Wheelchair Bound 
- Hold home Gabapentin 600mg BID given lethargy in morning 
- Continue Risperidone 2 mg at bedtime  
- Continue home Baclofen 20mg QID 
- Repositioning q1h to prevent bed sores 
  
Seizure Disorder: No recent episodes of seizures. No seizure-like activity while inpatient. - Continue Valproic Acid Delayed Release Sprinkle 1000 mg at bedtime  
  
Hypothyroidism: Most recent TSH in March 2018 was wnl.  
- Continue home Levothyroxine 50 mcg daily 
  
Chronic Constipation:  
- Continue home Miralax 
  
Diet: Puree/Honey-Thick Liquid Diet DVT Prophylaxis: Lovenox Code Status: FULL Point of Contact: Melissa Gay [phone] ([facility] 
  
Disposition and anticipated LOS: Home today or tomorrow, pending home oxygen delivery Chetan Puga. Harini Nieves MD, PGY-1 
[address] Medicine 12/02/18 10:11 AM

## 2018-12-02 NOTE — PROGRESS NOTES
0425 
Patient is awake, no apparent distress, no signs of difficulty breathing, temp and HR elevated, med given and tolerated well. Will monitor. Visit Vitals /71 (BP 1 Location: Right arm, BP Patient Position: At rest) Pulse (!) 103 Temp 99.8 °F (37.7 °C) Resp 22 Ht 4' 11\" (1.499 m) Wt 81.1 kg (178 lb 14.4 oz) SpO2 91% BMI 36.13 kg/m²

## 2018-12-03 LAB
ANION GAP SERPL CALC-SCNC: 5 MMOL/L (ref 3–18)
BACTERIA SPEC CULT: NORMAL
BACTERIA SPEC CULT: NORMAL
BASOPHILS # BLD: 0 K/UL (ref 0–0.1)
BASOPHILS NFR BLD: 0 % (ref 0–2)
BUN SERPL-MCNC: 15 MG/DL (ref 7–18)
BUN/CREAT SERPL: 28 (ref 12–20)
CALCIUM SERPL-MCNC: 8.8 MG/DL (ref 8.5–10.1)
CHLORIDE SERPL-SCNC: 107 MMOL/L (ref 100–108)
CO2 SERPL-SCNC: 28 MMOL/L (ref 21–32)
CREAT SERPL-MCNC: 0.54 MG/DL (ref 0.6–1.3)
DIFFERENTIAL METHOD BLD: ABNORMAL
EOSINOPHIL # BLD: 0.3 K/UL (ref 0–0.4)
EOSINOPHIL NFR BLD: 3 % (ref 0–5)
ERYTHROCYTE [DISTWIDTH] IN BLOOD BY AUTOMATED COUNT: 14.9 % (ref 11.6–14.5)
GLUCOSE SERPL-MCNC: 121 MG/DL (ref 74–99)
HCT VFR BLD AUTO: 40.2 % (ref 35–45)
HGB BLD-MCNC: 12.8 G/DL (ref 12–16)
LYMPHOCYTES # BLD: 2.8 K/UL (ref 0.9–3.6)
LYMPHOCYTES NFR BLD: 29 % (ref 21–52)
MCH RBC QN AUTO: 29.8 PG (ref 24–34)
MCHC RBC AUTO-ENTMCNC: 31.8 G/DL (ref 31–37)
MCV RBC AUTO: 93.5 FL (ref 74–97)
MONOCYTES # BLD: 1.4 K/UL (ref 0.05–1.2)
MONOCYTES NFR BLD: 15 % (ref 3–10)
NEUTS SEG # BLD: 4.9 K/UL (ref 1.8–8)
NEUTS SEG NFR BLD: 53 % (ref 40–73)
PLATELET # BLD AUTO: 291 K/UL (ref 135–420)
PMV BLD AUTO: 9.4 FL (ref 9.2–11.8)
POTASSIUM SERPL-SCNC: 4.3 MMOL/L (ref 3.5–5.5)
RBC # BLD AUTO: 4.3 M/UL (ref 4.2–5.3)
SERVICE CMNT-IMP: NORMAL
SERVICE CMNT-IMP: NORMAL
SODIUM SERPL-SCNC: 140 MMOL/L (ref 136–145)
WBC # BLD AUTO: 9.3 K/UL (ref 4.6–13.2)

## 2018-12-03 PROCEDURE — 74011250637 HC RX REV CODE- 250/637: Performed by: STUDENT IN AN ORGANIZED HEALTH CARE EDUCATION/TRAINING PROGRAM

## 2018-12-03 PROCEDURE — 74011250636 HC RX REV CODE- 250/636: Performed by: STUDENT IN AN ORGANIZED HEALTH CARE EDUCATION/TRAINING PROGRAM

## 2018-12-03 PROCEDURE — 77030038269 HC DRN EXT URIN PURWCK BARD -A

## 2018-12-03 PROCEDURE — 74011000250 HC RX REV CODE- 250: Performed by: STUDENT IN AN ORGANIZED HEALTH CARE EDUCATION/TRAINING PROGRAM

## 2018-12-03 PROCEDURE — 80048 BASIC METABOLIC PNL TOTAL CA: CPT

## 2018-12-03 PROCEDURE — 85025 COMPLETE CBC W/AUTO DIFF WBC: CPT

## 2018-12-03 PROCEDURE — 36415 COLL VENOUS BLD VENIPUNCTURE: CPT

## 2018-12-03 PROCEDURE — 77010033678 HC OXYGEN DAILY

## 2018-12-03 PROCEDURE — 65270000029 HC RM PRIVATE

## 2018-12-03 RX ADMIN — BACLOFEN 20 MG: 10 TABLET ORAL at 06:17

## 2018-12-03 RX ADMIN — CLINDAMYCIN HYDROCHLORIDE 450 MG: 150 CAPSULE ORAL at 13:42

## 2018-12-03 RX ADMIN — BACLOFEN 20 MG: 10 TABLET ORAL at 22:46

## 2018-12-03 RX ADMIN — LEVOTHYROXINE SODIUM 50 MCG: 50 TABLET ORAL at 06:17

## 2018-12-03 RX ADMIN — RISPERIDONE 2 MG: 2 TABLET ORAL at 22:47

## 2018-12-03 RX ADMIN — BACLOFEN 20 MG: 10 TABLET ORAL at 14:00

## 2018-12-03 RX ADMIN — METRONIDAZOLE: 7.5 GEL TOPICAL at 10:00

## 2018-12-03 RX ADMIN — LORAZEPAM 0.5 MG: 0.5 TABLET ORAL at 09:59

## 2018-12-03 RX ADMIN — LORATADINE 10 MG: 10 TABLET ORAL at 09:59

## 2018-12-03 RX ADMIN — CLINDAMYCIN HYDROCHLORIDE 450 MG: 150 CAPSULE ORAL at 22:47

## 2018-12-03 RX ADMIN — LORAZEPAM 0.5 MG: 0.5 TABLET ORAL at 17:28

## 2018-12-03 RX ADMIN — POLYETHYLENE GLYCOL 3350 17 G: 17 POWDER, FOR SOLUTION ORAL at 09:59

## 2018-12-03 RX ADMIN — DIVALPROEX SODIUM 1000 MG: 125 CAPSULE, COATED PELLETS ORAL at 22:46

## 2018-12-03 RX ADMIN — ENOXAPARIN SODIUM 40 MG: 100 INJECTION SUBCUTANEOUS at 13:42

## 2018-12-03 RX ADMIN — CLINDAMYCIN HYDROCHLORIDE 450 MG: 150 CAPSULE ORAL at 06:17

## 2018-12-03 NOTE — ROUTINE PROCESS
Bedside and Verbal shift change report given to Almaz James 364 (oncoming nurse) by Elvia Munoz RN (offgoing nurse). Report included the following information SBAR and Kardex.

## 2018-12-03 NOTE — PROGRESS NOTES
completed follow up visit with patient. Patient is non-communicative at this time. See flow sheets for interventions. Her eyes were open.  offered a prayer. No family present. Chaplains will continue to follow and will provide pastoral care as needed or requested. Sea Myrick MDiv. Board Certified [email or web address] Office [phone]

## 2018-12-03 NOTE — PROGRESS NOTES
Problem: Pressure Injury - Risk of 
Goal: *Prevention of pressure injury Document Reza Scale and appropriate interventions in the flowsheet. Outcome: Progressing Towards Goal 
Pressure Injury Interventions: 
Sensory Interventions: Check visual cues for pain Moisture Interventions: Apply protective barrier, creams and emollients, Maintain skin hydration (lotion/cream) Activity Interventions: Pressure redistribution bed/mattress(bed type) Mobility Interventions: Pressure redistribution bed/mattress (bed type) Nutrition Interventions: Document food/fluid/supplement intake Friction and Shear Interventions: HOB 30 degrees or less, Foam dressings/transparent film/skin sealants, Minimize layers, Lift team/patient mobility team, Lift sheet, Feet elevated on foot rest, Apply protective barrier, creams and emollients(foam dressing back and both heels applied n prevalone boots) Problem: Falls - Risk of 
Goal: *Absence of Falls Document Susie Rinaldi Fall Risk and appropriate interventions in the flowsheet. Outcome: Progressing Towards Goal 
Fall Risk Interventions: 
Mobility Interventions: Bed/chair exit alarm Mentation Interventions: Bed/chair exit alarm Medication Interventions: Bed/chair exit alarm Elimination Interventions: Bed/chair exit alarm

## 2018-12-03 NOTE — PROGRESS NOTES
Сергей Leon with [employer] is setting up monitoring for oxygen saturations and equipment for [facility] group home. AQUILINO JacobsN, RN Pager # [phone] Care Manager

## 2018-12-03 NOTE — PROGRESS NOTES
Intern Progress Note 120 [facility] Patient: Suma Khan MRN: [id number]  CSN: [id number] YOB: 1955  Age: 70 y.o. Sex: female DOA: 11/27/2018 LOS:  LOS: 6 days Subjective:  
 
Acute events: Patient had no acute problems overnight. This morning, she was at 94% on [facility] while sleeping. ROS limited given patient's chronic medical condition. Objective:  
  
Patient Vitals for the past 24 hrs: 
 Temp Pulse Resp BP SpO2  
12/03/18 0555 98.2 °F (36.8 °C) 85 16 115/69 94 % 12/03/18 0433 99.2 °F (37.3 °C) 80 18 114/67 92 % 12/02/18 1907 98.2 °F (36.8 °C) 95 22 132/80 97 % 12/02/18 1704 98.9 °F (37.2 °C) 97 21 135/85 93 % 12/02/18 1128 98.7 °F (37.1 °C) (!) 109 20 145/75 92 % Intake/Output Summary (Last 24 hours) at 12/3/2018 6304 Last data filed at 12/3/2018 6325 Gross per 24 hour Intake 1080 ml Output 975 ml Net 105 ml Physical Exam:  
General:  Alert and non-verbal at baseline and in No acute distress. Unable to follow commands. CV:  RRR, no murmurs, rubs, or gallops RESP:  Unlabored breathing. Lungs clear to auscultation. No wheeze, rales, or rhonchi. ABD:  Soft, nontender, mildly distended. No hepatosplenomegaly. No suprapubic tenderness. MS:  Contorted lower extremities bilaterally. Neuro:  Unable to assess given patient's condition. Ext:  No edema. 2+ radial and dp pulses bilaterally. Skin:  No rashes, lesions, or ulcers.   
  
Lab/Data Reviewed: 
Recent Results (from the past 12 hour(s)) METABOLIC PANEL, BASIC Collection Time: 12/03/18  3:27 AM  
Result Value Ref Range Sodium 140 136 - 145 mmol/L Potassium 4.3 3.5 - 5.5 mmol/L Chloride 107 100 - 108 mmol/L  
 CO2 28 21 - 32 mmol/L Anion gap 5 3.0 - 18 mmol/L Glucose 121 (H) 74 - 99 mg/dL BUN 15 7.0 - 18 MG/DL Creatinine 0.54 (L) 0.6 - 1.3 MG/DL  
 BUN/Creatinine ratio 28 (H) 12 - 20 GFR est AA >60 >60 ml/min/1.73m2 GFR est non-AA >60 >60 ml/min/1.73m2 Calcium 8.8 8.5 - 10.1 MG/DL  
CBC WITH AUTOMATED DIFF Collection Time: 12/03/18  3:27 AM  
Result Value Ref Range WBC 9.3 4.6 - 13.2 K/uL  
 RBC 4.30 4.20 - 5.30 M/uL  
 HGB 12.8 12.0 - 16.0 g/dL HCT 40.2 35.0 - 45.0 % MCV 93.5 74.0 - 97.0 FL  
 MCH 29.8 24.0 - 34.0 PG  
 MCHC 31.8 31.0 - 37.0 g/dL  
 RDW 14.9 (H) 11.6 - 14.5 % PLATELET 645 052 - 984 K/uL MPV 9.4 9.2 - 11.8 FL  
 NEUTROPHILS 53 40 - 73 % LYMPHOCYTES 29 21 - 52 % MONOCYTES 15 (H) 3 - 10 % EOSINOPHILS 3 0 - 5 % BASOPHILS 0 0 - 2 %  
 ABS. NEUTROPHILS 4.9 1.8 - 8.0 K/UL  
 ABS. LYMPHOCYTES 2.8 0.9 - 3.6 K/UL  
 ABS. MONOCYTES 1.4 (H) 0.05 - 1.2 K/UL  
 ABS. EOSINOPHILS 0.3 0.0 - 0.4 K/UL  
 ABS. BASOPHILS 0.0 0.0 - 0.1 K/UL  
 DF AUTOMATED Scheduled Medications Reviewed: 
Current Facility-Administered Medications Medication Dose Route Frequency  clindamycin (CLEOCIN) capsule 450 mg  450 mg Oral Q8H  
 levothyroxine (SYNTHROID) tablet 50 mcg  50 mcg Oral 6am  
 enoxaparin (LOVENOX) injection 40 mg  40 mg SubCUTAneous Q24H  
 baclofen (LIORESAL) tablet 20 mg  20 mg Oral Q8H  
 divalproex (DEPAKOTE SPRINKLE) capsule 1,000 mg  1,000 mg Oral QHS  loratadine (CLARITIN) tablet 10 mg  10 mg Oral DAILY  LORazepam (ATIVAN) tablet 0.5 mg  0.5 mg Oral BID  metroNIDAZOLE (METROGEL) 0.75 % gel   Topical DAILY  polyethylene glycol (MIRALAX) packet 17 g  17 g Oral DAILY  risperiDONE (RisperDAL) tablet 2 mg  2 mg Oral QHS Imaging, microbiology, and EKG/Telemetry: No results found. Assessment/Plan  
70 y.o. female with PMH Non-verbal, Diplegic Cerebral Palsy, Seizure Disorder, Intellectual Disability, Hypothyroidism, and Chronic Constipation, now admitted with possible aspiration pneumonia. 
  
Aspiration with Concerns of Hemodynamic Instability: Patient is doing better than on admission. She has not had another episode of aspiration. No G-tube needed at this time. CXR similar to admission.  
- Continue oral Clindamycin today for a full 7 day treatment (End date: 12/3) - Blood Cultures Negative - Diet: Puree - Fall/Aspiration Precautions 
  
Diplegic Cerebral Palsy/Intellectual Disability/Behavior Problem: Wheelchair Bound 
- Hold home Gabapentin 600mg BID given lethargy in morning 
- Continue Risperidone 2 mg at bedtime  
- Continue home Baclofen 20mg QID 
- Repositioning q1h to prevent bed sores 
  
Seizure Disorder: No recent episodes of seizures. No seizure-like activity while inpatient. - Continue Valproic Acid Delayed Release Sprinkle 1000 mg at bedtime  
  
Hypothyroidism: Most recent TSH in March 2018 was wnl.  
- Continue home Levothyroxine 50 mcg daily 
  
Chronic Constipation:  
- Continue home Miralax 
  
Diet: Puree/Honey-Thick Liquid Diet DVT Prophylaxis: Lovenox Code Status: FULL Point of Contact: Cheryle Chesterfield [phone] ([facility] ([facility] 
  
Disposition and anticipated LOS: Home today with oxygen [employer]. Keke Mart MD, PGY-1 
[address] Medicine 12/03/18 10:11 AM

## 2018-12-04 ENCOUNTER — APPOINTMENT (OUTPATIENT)
Dept: GENERAL RADIOLOGY | Age: 63
DRG: 177 | End: 2018-12-04
Attending: FAMILY MEDICINE
Payer: MEDICARE

## 2018-12-04 LAB
ANION GAP SERPL CALC-SCNC: 12 MMOL/L (ref 3–18)
ANION GAP SERPL CALC-SCNC: 12 MMOL/L (ref 3–18)
APPEARANCE UR: ABNORMAL
BACTERIA URNS QL MICRO: ABNORMAL /HPF
BASOPHILS # BLD: 0 K/UL (ref 0–0.1)
BASOPHILS # BLD: 0.1 K/UL (ref 0–0.1)
BASOPHILS NFR BLD: 0 % (ref 0–2)
BASOPHILS NFR BLD: 0 % (ref 0–2)
BILIRUB UR QL: NEGATIVE
BUN SERPL-MCNC: 15 MG/DL (ref 7–18)
BUN SERPL-MCNC: 18 MG/DL (ref 7–18)
BUN/CREAT SERPL: 26 (ref 12–20)
BUN/CREAT SERPL: 33 (ref 12–20)
CALCIUM SERPL-MCNC: 9.5 MG/DL (ref 8.5–10.1)
CALCIUM SERPL-MCNC: 9.6 MG/DL (ref 8.5–10.1)
CHLORIDE SERPL-SCNC: 103 MMOL/L (ref 100–108)
CHLORIDE SERPL-SCNC: 104 MMOL/L (ref 100–108)
CO2 SERPL-SCNC: 23 MMOL/L (ref 21–32)
CO2 SERPL-SCNC: 24 MMOL/L (ref 21–32)
COLOR UR: YELLOW
CREAT SERPL-MCNC: 0.54 MG/DL (ref 0.6–1.3)
CREAT SERPL-MCNC: 0.57 MG/DL (ref 0.6–1.3)
DIFFERENTIAL METHOD BLD: ABNORMAL
DIFFERENTIAL METHOD BLD: ABNORMAL
EOSINOPHIL # BLD: 0 K/UL (ref 0–0.4)
EOSINOPHIL # BLD: 0.2 K/UL (ref 0–0.4)
EOSINOPHIL NFR BLD: 0 % (ref 0–5)
EOSINOPHIL NFR BLD: 1 % (ref 0–5)
EPITH CASTS URNS QL MICRO: ABNORMAL /LPF (ref 0–5)
ERYTHROCYTE [DISTWIDTH] IN BLOOD BY AUTOMATED COUNT: 14.5 % (ref 11.6–14.5)
ERYTHROCYTE [DISTWIDTH] IN BLOOD BY AUTOMATED COUNT: 14.7 % (ref 11.6–14.5)
GLUCOSE SERPL-MCNC: 111 MG/DL (ref 74–99)
GLUCOSE SERPL-MCNC: 133 MG/DL (ref 74–99)
GLUCOSE UR STRIP.AUTO-MCNC: NEGATIVE MG/DL
HCT VFR BLD AUTO: 41.7 % (ref 35–45)
HCT VFR BLD AUTO: 41.7 % (ref 35–45)
HGB BLD-MCNC: 13.3 G/DL (ref 12–16)
HGB BLD-MCNC: 13.8 G/DL (ref 12–16)
HGB UR QL STRIP: NEGATIVE
KETONES UR QL STRIP.AUTO: NEGATIVE MG/DL
LACTATE SERPL-SCNC: 1.8 MMOL/L (ref 0.4–2)
LACTATE SERPL-SCNC: 3.1 MMOL/L (ref 0.4–2)
LEUKOCYTE ESTERASE UR QL STRIP.AUTO: ABNORMAL
LYMPHOCYTES # BLD: 1.7 K/UL (ref 0.9–3.6)
LYMPHOCYTES # BLD: 2.5 K/UL (ref 0.9–3.6)
LYMPHOCYTES NFR BLD: 13 % (ref 21–52)
LYMPHOCYTES NFR BLD: 19 % (ref 21–52)
MCH RBC QN AUTO: 30 PG (ref 24–34)
MCH RBC QN AUTO: 30.5 PG (ref 24–34)
MCHC RBC AUTO-ENTMCNC: 31.9 G/DL (ref 31–37)
MCHC RBC AUTO-ENTMCNC: 33.1 G/DL (ref 31–37)
MCV RBC AUTO: 92.1 FL (ref 74–97)
MCV RBC AUTO: 93.9 FL (ref 74–97)
MONOCYTES # BLD: 1.9 K/UL (ref 0.05–1.2)
MONOCYTES # BLD: 2.2 K/UL (ref 0.05–1.2)
MONOCYTES NFR BLD: 15 % (ref 3–10)
MONOCYTES NFR BLD: 17 % (ref 3–10)
NEUTS SEG # BLD: 8.2 K/UL (ref 1.8–8)
NEUTS SEG # BLD: 9 K/UL (ref 1.8–8)
NEUTS SEG NFR BLD: 65 % (ref 40–73)
NEUTS SEG NFR BLD: 70 % (ref 40–73)
NITRITE UR QL STRIP.AUTO: NEGATIVE
PH UR STRIP: 7.5 [PH] (ref 5–8)
PLATELET # BLD AUTO: 356 K/UL (ref 135–420)
PLATELET # BLD AUTO: 368 K/UL (ref 135–420)
PMV BLD AUTO: 9.1 FL (ref 9.2–11.8)
PMV BLD AUTO: 9.5 FL (ref 9.2–11.8)
POTASSIUM SERPL-SCNC: 4.2 MMOL/L (ref 3.5–5.5)
POTASSIUM SERPL-SCNC: 4.3 MMOL/L (ref 3.5–5.5)
PROT UR STRIP-MCNC: NEGATIVE MG/DL
RBC # BLD AUTO: 4.44 M/UL (ref 4.2–5.3)
RBC # BLD AUTO: 4.53 M/UL (ref 4.2–5.3)
RBC #/AREA URNS HPF: ABNORMAL /HPF (ref 0–5)
SODIUM SERPL-SCNC: 138 MMOL/L (ref 136–145)
SODIUM SERPL-SCNC: 140 MMOL/L (ref 136–145)
SP GR UR REFRACTOMETRY: 1.02 (ref 1–1.03)
UROBILINOGEN UR QL STRIP.AUTO: 1 EU/DL (ref 0.2–1)
WBC # BLD AUTO: 12.8 K/UL (ref 4.6–13.2)
WBC # BLD AUTO: 12.9 K/UL (ref 4.6–13.2)
WBC URNS QL MICRO: ABNORMAL /HPF (ref 0–4)

## 2018-12-04 PROCEDURE — 87077 CULTURE AEROBIC IDENTIFY: CPT

## 2018-12-04 PROCEDURE — 74011250637 HC RX REV CODE- 250/637: Performed by: STUDENT IN AN ORGANIZED HEALTH CARE EDUCATION/TRAINING PROGRAM

## 2018-12-04 PROCEDURE — 83605 ASSAY OF LACTIC ACID: CPT

## 2018-12-04 PROCEDURE — 77030038269 HC DRN EXT URIN PURWCK BARD -A

## 2018-12-04 PROCEDURE — 77030018719 HC DRSG PTCH ANTIMIC J&J -A

## 2018-12-04 PROCEDURE — 65270000029 HC RM PRIVATE

## 2018-12-04 PROCEDURE — 81001 URINALYSIS AUTO W/SCOPE: CPT

## 2018-12-04 PROCEDURE — 74011000250 HC RX REV CODE- 250: Performed by: STUDENT IN AN ORGANIZED HEALTH CARE EDUCATION/TRAINING PROGRAM

## 2018-12-04 PROCEDURE — 36415 COLL VENOUS BLD VENIPUNCTURE: CPT

## 2018-12-04 PROCEDURE — 74011250636 HC RX REV CODE- 250/636: Performed by: STUDENT IN AN ORGANIZED HEALTH CARE EDUCATION/TRAINING PROGRAM

## 2018-12-04 PROCEDURE — 87040 BLOOD CULTURE FOR BACTERIA: CPT

## 2018-12-04 PROCEDURE — 77030037875 HC DRSG MEPILEX <16IN BORD MOLN -A

## 2018-12-04 PROCEDURE — 80048 BASIC METABOLIC PNL TOTAL CA: CPT

## 2018-12-04 PROCEDURE — 87186 SC STD MICRODIL/AGAR DIL: CPT

## 2018-12-04 PROCEDURE — 87086 URINE CULTURE/COLONY COUNT: CPT

## 2018-12-04 PROCEDURE — 71046 X-RAY EXAM CHEST 2 VIEWS: CPT

## 2018-12-04 PROCEDURE — 77030011256 HC DRSG MEPILEX <16IN NO BORD MOLN -A

## 2018-12-04 PROCEDURE — 85025 COMPLETE CBC W/AUTO DIFF WBC: CPT

## 2018-12-04 PROCEDURE — 92526 ORAL FUNCTION THERAPY: CPT

## 2018-12-04 RX ORDER — ACETAMINOPHEN 325 MG/1
325 TABLET ORAL
Status: DISCONTINUED | OUTPATIENT
Start: 2018-12-04 | End: 2018-12-06 | Stop reason: HOSPADM

## 2018-12-04 RX ADMIN — CLINDAMYCIN HYDROCHLORIDE 450 MG: 150 CAPSULE ORAL at 21:59

## 2018-12-04 RX ADMIN — BACLOFEN 20 MG: 10 TABLET ORAL at 06:21

## 2018-12-04 RX ADMIN — LORATADINE 10 MG: 10 TABLET ORAL at 08:55

## 2018-12-04 RX ADMIN — DIVALPROEX SODIUM 1000 MG: 125 CAPSULE, COATED PELLETS ORAL at 21:59

## 2018-12-04 RX ADMIN — POLYETHYLENE GLYCOL 3350 17 G: 17 POWDER, FOR SOLUTION ORAL at 08:55

## 2018-12-04 RX ADMIN — METRONIDAZOLE: 7.5 GEL TOPICAL at 09:00

## 2018-12-04 RX ADMIN — LORAZEPAM 0.5 MG: 0.5 TABLET ORAL at 17:45

## 2018-12-04 RX ADMIN — CLINDAMYCIN HYDROCHLORIDE 450 MG: 150 CAPSULE ORAL at 06:23

## 2018-12-04 RX ADMIN — SODIUM CHLORIDE, SODIUM LACTATE, POTASSIUM CHLORIDE, AND CALCIUM CHLORIDE 1000 ML: 600; 310; 30; 20 INJECTION, SOLUTION INTRAVENOUS at 14:47

## 2018-12-04 RX ADMIN — RISPERIDONE 2 MG: 2 TABLET ORAL at 21:59

## 2018-12-04 RX ADMIN — ACETAMINOPHEN 325 MG: 325 TABLET ORAL at 14:44

## 2018-12-04 RX ADMIN — BACLOFEN 20 MG: 10 TABLET ORAL at 14:44

## 2018-12-04 RX ADMIN — CLINDAMYCIN HYDROCHLORIDE 450 MG: 150 CAPSULE ORAL at 14:44

## 2018-12-04 RX ADMIN — BACLOFEN 20 MG: 10 TABLET ORAL at 21:59

## 2018-12-04 RX ADMIN — LEVOTHYROXINE SODIUM 50 MCG: 50 TABLET ORAL at 06:23

## 2018-12-04 RX ADMIN — ENOXAPARIN SODIUM 40 MG: 100 INJECTION SUBCUTANEOUS at 14:45

## 2018-12-04 RX ADMIN — IBUPROFEN 200 MG: 200 TABLET, FILM COATED ORAL at 11:13

## 2018-12-04 RX ADMIN — LORAZEPAM 0.5 MG: 0.5 TABLET ORAL at 08:55

## 2018-12-04 NOTE — PROGRESS NOTES
Intern Progress Note 120 [facility] Patient: Chilo Reyes MRN: [id number]  CSN: [id number] YOB: 1955  Age: 70 y.o. Sex: female DOA: 11/27/2018 LOS:  LOS: 7 days Subjective:  
 
Acute events: Patient had no acute problems overnight. ROS limited given patient's chronic medical condition. Objective:  
  
Patient Vitals for the past 24 hrs: 
 Temp Pulse Resp BP SpO2  
12/04/18 0551 99.6 °F (37.6 °C) 99 20 128/74   
12/04/18 0336 98.9 °F (37.2 °C) 100 20 150/79 94 % 12/03/18 1911 98.7 °F (37.1 °C) 97 18 118/76 93 % 12/03/18 1507 98.2 °F (36.8 °C) 96 16 122/70 91 % 12/03/18 1058 98.9 °F (37.2 °C) 97 20 118/72 96 % Intake/Output Summary (Last 24 hours) at 12/4/2018 6667 Last data filed at 12/4/2018 1558 Gross per 24 hour Intake 240 ml Output 450 ml Net -210 ml Physical Exam:  
General:  Alert and non-verbal at baseline and in No acute distress. Unable to follow commands. CV:  RRR, no murmurs, rubs, or gallops RESP:  Unlabored breathing. Lungs clear to auscultation. No wheeze, rales, or rhonchi. ABD:  Soft, nontender, mildly distended. No hepatosplenomegaly. No suprapubic tenderness. MS:  Contorted lower extremities bilaterally. Neuro:  Unable to assess given patient's condition. Ext:  No edema. 2+ radial and dp pulses bilaterally. Skin:  No rashes, lesions, or ulcers.   
  
Lab/Data Reviewed: 
Recent Results (from the past 12 hour(s)) METABOLIC PANEL, BASIC Collection Time: 12/04/18  4:41 AM  
Result Value Ref Range Sodium 140 136 - 145 mmol/L Potassium 4.2 3.5 - 5.5 mmol/L Chloride 104 100 - 108 mmol/L  
 CO2 24 21 - 32 mmol/L Anion gap 12 3.0 - 18 mmol/L Glucose 111 (H) 74 - 99 mg/dL BUN 15 7.0 - 18 MG/DL Creatinine 0.57 (L) 0.6 - 1.3 MG/DL  
 BUN/Creatinine ratio 26 (H) 12 - 20 GFR est AA >60 >60 ml/min/1.73m2 GFR est non-AA >60 >60 ml/min/1.73m2 Calcium 9.5 8.5 - 10.1 MG/DL  
CBC WITH AUTOMATED DIFF Collection Time: 12/04/18  4:41 AM  
Result Value Ref Range WBC 12.8 4.6 - 13.2 K/uL  
 RBC 4.44 4.20 - 5.30 M/uL  
 HGB 13.3 12.0 - 16.0 g/dL HCT 41.7 35.0 - 45.0 % MCV 93.9 74.0 - 97.0 FL  
 MCH 30.0 24.0 - 34.0 PG  
 MCHC 31.9 31.0 - 37.0 g/dL  
 RDW 14.7 (H) 11.6 - 14.5 % PLATELET 136 588 - 739 K/uL MPV 9.5 9.2 - 11.8 FL  
 NEUTROPHILS 65 40 - 73 % LYMPHOCYTES 19 (L) 21 - 52 % MONOCYTES 15 (H) 3 - 10 % EOSINOPHILS 1 0 - 5 % BASOPHILS 0 0 - 2 %  
 ABS. NEUTROPHILS 8.2 (H) 1.8 - 8.0 K/UL  
 ABS. LYMPHOCYTES 2.5 0.9 - 3.6 K/UL  
 ABS. MONOCYTES 1.9 (H) 0.05 - 1.2 K/UL  
 ABS. EOSINOPHILS 0.2 0.0 - 0.4 K/UL  
 ABS. BASOPHILS 0.1 0.0 - 0.1 K/UL  
 DF AUTOMATED Scheduled Medications Reviewed: 
Current Facility-Administered Medications Medication Dose Route Frequency  clindamycin (CLEOCIN) capsule 450 mg  450 mg Oral Q8H  
 levothyroxine (SYNTHROID) tablet 50 mcg  50 mcg Oral 6am  
 enoxaparin (LOVENOX) injection 40 mg  40 mg SubCUTAneous Q24H  
 baclofen (LIORESAL) tablet 20 mg  20 mg Oral Q8H  
 divalproex (DEPAKOTE SPRINKLE) capsule 1,000 mg  1,000 mg Oral QHS  loratadine (CLARITIN) tablet 10 mg  10 mg Oral DAILY  LORazepam (ATIVAN) tablet 0.5 mg  0.5 mg Oral BID  metroNIDAZOLE (METROGEL) 0.75 % gel   Topical DAILY  polyethylene glycol (MIRALAX) packet 17 g  17 g Oral DAILY  risperiDONE (RisperDAL) tablet 2 mg  2 mg Oral QHS Imaging, microbiology, and EKG/Telemetry: No results found. Assessment/Plan  
70 y.o. female with PMH Non-verbal, Diplegic Cerebral Palsy, Seizure Disorder, Intellectual Disability, Hypothyroidism, and Chronic Constipation, now admitted with possible aspiration pneumonia. 
  
Aspiration with Concerns of Hemodynamic Instability: Patient is doing better than on admission. She has not had another episode of aspiration. No G-tube needed at this time. CXR similar to admission.  
- Continue oral Clindamycin today for a full 7 day treatment (End date: 12/3) - Blood Cultures Negative - Diet: Puree - Fall/Aspiration Precautions - Discuss overnight home oxygen test 
  
Diplegic Cerebral Palsy/Intellectual Disability/Behavior Problem: Wheelchair Bound 
- Hold home Gabapentin 600mg BID given lethargy in morning 
- Continue Risperidone 2 mg at bedtime  
- Continue home Baclofen 20mg QID 
- Repositioning q1h to prevent bed sores 
  
Seizure Disorder: No recent episodes of seizures. No seizure-like activity while inpatient. - Continue Valproic Acid Delayed Release Sprinkle 1000 mg at bedtime  
  
Hypothyroidism: Most recent TSH in March 2018 was wnl.  
- Continue home Levothyroxine 50 mcg daily 
  
Chronic Constipation:  
- Continue home Miralax 
  
Diet: Puree/Honey-Thick Liquid Diet DVT Prophylaxis: Lovenox Code Status: FULL Point of Contact: Amy Cheek [phone] (Relationship: Supervisor at [facility] (RN)) 
  
Disposition and anticipated LOS: Home today, tentatively [facility]. Tomás Calvin MD, PGY-1 
[address] Medicine 12/04/18 10:11 AM

## 2018-12-04 NOTE — PROGRESS NOTES
Discharge planning: 
 
Reviewed chart and spoke with Nurse Ramesh Green. Per nurse, pt's discharge will be delayed due to medically unstable. When discharged, pt will be transported to the following address: [address] [place] [address], [address] [place] [phone] per caregiver. CM will continue to monitor for transitional need

## 2018-12-04 NOTE — ROUTINE PROCESS
Bedside shift change report given to Solo Farnsworth (oncoming nurse) by Sherri Jimenez (offgoing nurse). Report included the following information SBAR.

## 2018-12-04 NOTE — PROGRESS NOTES
MARIA R ROBBINS/ [facility], [phone], WILL ARRANGE FOR CONCENTRATOR AND PORTABLES TO BE DELIVERED TO THIS PATIENTS GROUP HOME:  [address] [facility], [address] [place] [address], [place] [id number]. I HAVE NOTIFIED THE OWNER, ESHA RENTERIA, [id number], OF THE ARRANGEMENTS FOR THE DELIVERY TO HAPPEN 12-4-18.

## 2018-12-04 NOTE — PROGRESS NOTES
Respiratory check was done overnight to evaluate possible need of home oxygen. Patient did not fall asleep until late. Her pulse ox checks were as follows while she was in deep sleep: 
 
1st Check @ 0513, 12/4/18: Minute 1- 88% on RA Minute 2 -88% on RA Minute 3 -88% on RA Minute 4 -89% on RA Minute 5 -89% on RA 2nd Check @ 5234, 12/4/18 : Minute 1 -91% on RA Minute 2 -88% on RA Minute 3 -90% on RA Minute 4 -89% on RA Minute 5 -88% on RA Kirsten Cherry D.O.  
[address] [facility], PGY-1

## 2018-12-04 NOTE — PROGRESS NOTES
Spoke to resident on phone regarding patients vitals signs temp 100.5 axillary, , and RR 26. Waiting for orders.

## 2018-12-04 NOTE — PROGRESS NOTES
Patient with fever and tachycardia. I ordered CBC, Lactic Acid, BMP, U/A, Urine CX, CXR (PA/Lat). Will re-evaluate. Given vital signs, will hold off on discharge at this time. May discharge tomorrow. I discussed with Bal Sheldon and I will discuss Ms. Lozano's care with her tomorrow as well with updates. Wiliam Vidales MD, PGY-3 
[address] [place] December 4, 2018 1:12 PM

## 2018-12-04 NOTE — PROGRESS NOTES
Problem: Dysphagia (Adult) Goal: *Acute Goals and Plan of Care (Insert Text) Recommendations: 
Diet: Puree with thin liquids Meds: in puree Aspiration Precautions Oral Care TID Other: Assistance with PO, slow rate, small bites/sips Goals:  Patient will: 1. Tolerate PO trials with 0 s/s overt distress in 4/5 trials - met 2. Utilize compensatory swallow strategies/maneuvers (decrease bite/sip, size/rate, alt. liq/sol) with min cues in 4/5 trials - met with assistance 3. Complete an objective swallow study (i.e., MBSS) to assess swallow integrity, r/o aspiration, and determine of safest LRD, min A - met 11/29/18 Outcome: Resolved/Met Date Met: 12/04/18 Speech language pathology dysphagia treatment/discharge Patient: Yann Singh (5 y.o. female) Date: 12/4/2018 Diagnosis: Aspiration pneumonia ([address] Hypoxia Mental retardation Cerebral palsy ([address] Aspiration pneumonia ([address] Hypoxia Mental retardation Cerebral palsy ([address] Aspiration pneumonia ([address] Aspiration pneumonia ([address] Precautions: aspiration ASSESSMENT: 
Pt was seen at bedside for f/u dysphagia management. Pt fed entirety of breakfast tray of puree with thin liquid with no overt s/sx of aspiration. Laryngeal elevation appeared weak to palpation. Pt with delayed mastication and a-p transit of puree solids with positive oral clearance. Min facial redness observed which was also present at baseline. She completed MBS at this facility on 11/29/18 with the following results: \"mod oral dysphagia. She tolerated puree, honey-thick, nectar-thick, thin liquids via cup/sip, and 13 mm Ba pill whole in puree without aspiration/penetration events. Pt edentulous with incoordinated labial movement; however, all other structures grossly intact for mastication and deglutition. A-P transit, swallow initiation and hyolaryngeal elevation appear timely.  Tongue base retraction, laryngeal elevation, and pharyngeal motility were functional/timely. Positive oropharyngeal clearance appreciated. \" Given MBS results and current bedside presentation, suspect pt is tolerating LRD without any overt s/sx of aspiration. Rec puree diet with thin liquids, aspiration precautions, oral care TID, and meds in puree. Pt will require assistance with PO. No further skilled SLP services are indicated at this time. Please re-consult if needed. PLAN: 
Recommendations and Planned Interventions: See above Discharge Recommendations:  [facility], and To Be Determined SUBJECTIVE:  
Patient aphonic. OBJECTIVE:  
Cognitive and Communication Status: 
Neurologic State: Alert Orientation Level: Unable to verbalize, Disoriented X4 Cognition: No command following Perception: Appears intact Perseveration: Perseverates during mobility Safety/Judgement: Lack of insight into deficits Dysphagia Treatment: 
Oral Assessment: 
Oral Assessment Labial: No impairment Dentition: Edentulous Oral Hygiene: adequate Lingual: Decreased rate, Decreased strength Velum: No impairment Mandible: No impairment P.O. Trials: 
 Patient Position: [facility] Vocal quality prior to P.O.:   
 Consistency Presented: Honey thick liquid, Puree How Presented: SLP-fed/presented, Spoon Bolus Acceptance: No impairment Bolus Formation/Control: Impaired Type of Impairment: Delayed, Mastication Propulsion: No impairment Oral Residue: None Initiation of Swallow: No impairment Laryngeal Elevation: Functional 
 Aspiration Signs/Symptoms: None Pharyngeal Phase Characteristics: No impairment, issues, or problems Effective Modifications: Spoon Cues for Modifications: Moderate Oral Phase Severity: Moderate Pharyngeal Phase Severity : Moderate PAIN: 
Pt reports 0/10 pain or discomfort prior to tx. Pt reports 0/10 pain or discomfort post tx. After treatment: []              Patient left in no apparent distress sitting up in chair 
[x]              Patient left in no apparent distress in bed 
[x]              Call bell left within reach [x]              Nursing notified 
[]              Caregiver present 
[]              Bed alarm activated COMMUNICATION/EDUCATION:  
[x]              SLP educated pt with regard to compensatory swallow strategies and 
      aspiration/reflux precautions including: small bites/sips, 
      alternate liquids/solids, decrease feeding rate, HOB > 45 with all po, and 
                 upright in bed at 30 degrees after po for at least 45 minutes. Suspect limited carryover. Thank you for this referral. 
 
Bryan Durant M.S. CCC-SLP/L Speech-Language Pathologist

## 2018-12-04 NOTE — PROGRESS NOTES
NUTRITION Nutrition Consult: General Nutrition Management & Supplements RECOMMENDATIONS / PLAN:  
 
- Continue a general, healthy diet, consistency per SLP recommendations. Monitor meal intake, diet tolerance and weight. 
- Continue RD inpatient monitoring and evaluation. NUTRITION INTERVENTIONS & DIAGNOSIS:  
 
[x] Meals/snacks: modified composition Nutrition Diagnosis: No nutrition diagnosis at this time. ASSESSMENT:  
 
12/4: Tolerating diet with good meal intake. Noted plan for discharge delayed. 11/30: Admitted after episode of choking and aspiration. SLP evaluation and MBS completed. Pt non-verbal, unable to obtain information from pt. Caregiver not present, attempted to call by phone, no answer. Meal intake has been good since admission and was good PTA per caregiver at previous visit. Pt obese, wheelchair bound, at high risk for pressure ulcers due to limited mobility. Pt not appropriate for nutrition education/counseling due to mentation; caregiver not present; per MD caregiver requested nutrition consult. Noted plan for discharge to group home. Addendum: Spoke with caregiver about weight management and general, healthy eating. Average po intake adequate to meet patients estimated nutritional needs:   [x] Yes     [] No   [] Unable to determine at this time Diet: DIET DYSPHAGIA PUREED (NDD1) Food Allergies: NKFA Current Appetite:   [x] Good     [] Fair     [] Poor     [] Other: 
Appetite/meal intake prior to admission:   [x] Good     [] Fair     [] Poor     [] Other: 
Feeding Limitations:  [x] Swallowing difficulty: SLP following    [] Chewing difficulty    [] Other: 
Current Meal Intake: Patient Vitals for the past 100 hrs: 
 % Diet Eaten 12/03/18 1311 100 % 12/02/18 1706 100 % 12/02/18 1503 100 % 12/02/18 1029 100 % 12/01/18 1400 100 % 12/01/18 0904 100 % 11/30/18 1209 100 % BM: 11/29 Skin Integrity: WDL Edema:   [x] No     [] Yes Pertinent Medications: Reviewed Recent Labs 12/04/18 
1313 12/04/18 
0441 12/03/18 
0327  140 140  
K 4.3 4.2 4.3  104 107 CO2 23 24 28 * 111* 121* BUN 18 15 15 CREA 0.54* 0.57* 0.54* CA 9.6 9.5 8.8 Intake/Output Summary (Last 24 hours) at 12/4/2018 1459 Last data filed at 12/4/2018 1300 Gross per 24 hour Intake 117 ml Output 550 ml Net -433 ml Anthropometrics: 
Ht Readings from Last 1 Encounters:  
11/30/18 4' 11\" (1.499 m) Last 3 Recorded Weights in this Encounter 12/02/18 0484 12/03/18 0533 12/04/18 0541 Weight: 84.3 kg (185 lb 12.8 oz) 82.1 kg (181 lb) 83 kg (183 lb) Body mass index is 36.96 kg/m². Obese, class II Weight History: Caregiver denied changes in weight PTA Weight Metrics 12/4/2018 Weight 183 lb BMI 36.96 kg/m2 Admitting Diagnosis: Aspiration pneumonia ([address] Hypoxia Mental retardation Cerebral palsy ([address] Aspiration pneumonia ([address] Hypoxia Mental retardation Cerebral palsy ([address] Aspiration pneumonia ([address] Pertinent PMHx: elevated blood sugar, constipation Education Needs:        [] None identified  [] Identified - Not appropriate at this time  [x]  Identified and addressed - refer to education log Learning Limitations:   [] None identified  [x] Identified: mental retardation, cerebral palsy Cultural, Faith & ethnic food preferences:  [x] None identified    [] Identified and addressed ESTIMATED NUTRITION NEEDS:  
 
Calories: 5799-3115 kcal (MSJx1.2-1.3) based on  [x] Actual BW 83 kg     [] IBW Protein: 67-83 gm (0.8-1 gm/kg) based on  [x] Actual BW      [] IBW Fluid: 1 mL/kcal 
  
MONITORING & EVALUATION:  
 
Nutrition Goal(s): 1. Po intake of meals will meet >75% of patient estimated nutritional needs within the next 7 days.   Outcome:  [x] Met/Ongoing    []  Not Met    [x] New/Initial Goal  
 
Monitoring:   [x] Food and beverage intake   [x] Diet order   [x] Nutrition-focused physical findings   [x] Treatment/therapy   [] Weight   [] Enteral nutrition intake Previous Recommendations (for follow-up assessments only):     [x]   Implemented       []   Not Implemented (RD to address)      [] No Longer Appropriate     [] No Recommendation Made Discharge Planning: general, healthy diet, consistency per SLP [x] Participated in care planning, discharge planning, & interdisciplinary rounds as appropriate Cameron Keys RD, [facility] Pager: [phone]

## 2018-12-04 NOTE — PROGRESS NOTES
SUBJECTIVE:  
Called to bedside by nurse because patient was tachycardic and febrile. Upon arrival, patient was being cleaned. OBJECTIVE: 
Visit Vitals /69 (BP 1 Location: Left arm, BP Patient Position: At rest) Pulse (!) 101 Temp (!) 100.5 °F (38.1 °C) Resp 26 Ht 4' 11\" (1.499 m) Wt 83 kg (183 lb) SpO2 91% BMI 36.96 kg/m² Physical Exam 
General:  Alert and non-verbal at baseline and in No acute distress. Unable to follow commands.  
CV:  RRR, no murmurs, rubs, or gallops    
RESP:  Unlabored breathing. Lungs clear to auscultation. No wheeze, rales, or rhonchi. ABD:  Soft, nontender, mildly distended. No hepatosplenomegaly. No suprapubic tenderness. MS:  Contorted lower extremities bilaterally. Neuro:  Unable to assess given patient's condition. Ext:  No edema. 2+ radial and dp pulses bilaterally. Skin:  No rashes, lesions, or ulcers.   
 
 
ASSESSMENT & PLAN: 
1. 1L bolus LR 
2. Tylenol 325mg q4h PRN for fever 3. UA and culter 4. Blood Cultures 5. CXR- PA and Lateral  
6. Trend Lactic Acid Barry Flavin. Neda Cali MD, PGY-1 
P[address]. [address] 12/04/18 2:23 PM 
 
________________________________________ Checked on patient and she was doing better. She finished her dinner and had no episodes of aspiration. Appeared similar to last exam. Temperature improved and patient is not tachycardic anymore. Yaniv Flavin. Neda Cali MD, PGY-1 
P.O. [facility] Medicine 12/04/18 5:55 PM

## 2018-12-05 LAB
ANION GAP SERPL CALC-SCNC: 11 MMOL/L (ref 3–18)
BASOPHILS # BLD: 0 K/UL (ref 0–0.06)
BASOPHILS NFR BLD: 0 % (ref 0–3)
BUN SERPL-MCNC: 14 MG/DL (ref 7–18)
BUN/CREAT SERPL: 32 (ref 12–20)
CALCIUM SERPL-MCNC: 9.2 MG/DL (ref 8.5–10.1)
CHLORIDE SERPL-SCNC: 106 MMOL/L (ref 100–108)
CO2 SERPL-SCNC: 23 MMOL/L (ref 21–32)
CREAT SERPL-MCNC: 0.44 MG/DL (ref 0.6–1.3)
DIFFERENTIAL METHOD BLD: ABNORMAL
EOSINOPHIL # BLD: 0 K/UL (ref 0–0.4)
EOSINOPHIL NFR BLD: 0 % (ref 0–5)
ERYTHROCYTE [DISTWIDTH] IN BLOOD BY AUTOMATED COUNT: 14.4 % (ref 11.6–14.5)
GLUCOSE SERPL-MCNC: 99 MG/DL (ref 74–99)
HCT VFR BLD AUTO: 38.5 % (ref 35–45)
HGB BLD-MCNC: 12.6 G/DL (ref 12–16)
LYMPHOCYTES # BLD: 3 K/UL (ref 0.8–3.5)
LYMPHOCYTES NFR BLD: 26 % (ref 20–51)
MCH RBC QN AUTO: 30.1 PG (ref 24–34)
MCHC RBC AUTO-ENTMCNC: 32.7 G/DL (ref 31–37)
MCV RBC AUTO: 91.9 FL (ref 74–97)
METAMYELOCYTES NFR BLD MANUAL: 1 %
MONOCYTES # BLD: 1.5 K/UL (ref 0–1)
MONOCYTES NFR BLD: 13 % (ref 2–9)
NEUTS BAND NFR BLD MANUAL: 10 % (ref 0–5)
NEUTS SEG # BLD: 7 K/UL (ref 1.8–8)
NEUTS SEG NFR BLD: 50 % (ref 42–75)
PLATELET # BLD AUTO: 350 K/UL (ref 135–420)
PLATELET COMMENTS,PCOM: ABNORMAL
PMV BLD AUTO: 9.3 FL (ref 9.2–11.8)
POTASSIUM SERPL-SCNC: 4.2 MMOL/L (ref 3.5–5.5)
RBC # BLD AUTO: 4.19 M/UL (ref 4.2–5.3)
RBC MORPH BLD: ABNORMAL
SODIUM SERPL-SCNC: 140 MMOL/L (ref 136–145)
WBC # BLD AUTO: 11.6 K/UL (ref 4.6–13.2)

## 2018-12-05 PROCEDURE — 65270000029 HC RM PRIVATE

## 2018-12-05 PROCEDURE — 85025 COMPLETE CBC W/AUTO DIFF WBC: CPT

## 2018-12-05 PROCEDURE — 36415 COLL VENOUS BLD VENIPUNCTURE: CPT

## 2018-12-05 PROCEDURE — 80048 BASIC METABOLIC PNL TOTAL CA: CPT

## 2018-12-05 PROCEDURE — 74011000250 HC RX REV CODE- 250: Performed by: FAMILY MEDICINE

## 2018-12-05 PROCEDURE — 74011250637 HC RX REV CODE- 250/637: Performed by: STUDENT IN AN ORGANIZED HEALTH CARE EDUCATION/TRAINING PROGRAM

## 2018-12-05 PROCEDURE — 74011250636 HC RX REV CODE- 250/636: Performed by: FAMILY MEDICINE

## 2018-12-05 PROCEDURE — 74011000250 HC RX REV CODE- 250: Performed by: STUDENT IN AN ORGANIZED HEALTH CARE EDUCATION/TRAINING PROGRAM

## 2018-12-05 PROCEDURE — 77030038269 HC DRN EXT URIN PURWCK BARD -A

## 2018-12-05 PROCEDURE — 74011250636 HC RX REV CODE- 250/636: Performed by: STUDENT IN AN ORGANIZED HEALTH CARE EDUCATION/TRAINING PROGRAM

## 2018-12-05 RX ADMIN — POLYETHYLENE GLYCOL 3350 17 G: 17 POWDER, FOR SOLUTION ORAL at 09:21

## 2018-12-05 RX ADMIN — CLINDAMYCIN HYDROCHLORIDE 450 MG: 150 CAPSULE ORAL at 13:56

## 2018-12-05 RX ADMIN — BACLOFEN 20 MG: 10 TABLET ORAL at 06:11

## 2018-12-05 RX ADMIN — LORATADINE 10 MG: 10 TABLET ORAL at 09:21

## 2018-12-05 RX ADMIN — DIVALPROEX SODIUM 1000 MG: 125 CAPSULE, COATED PELLETS ORAL at 22:10

## 2018-12-05 RX ADMIN — BACLOFEN 20 MG: 10 TABLET ORAL at 13:56

## 2018-12-05 RX ADMIN — BACLOFEN 20 MG: 10 TABLET ORAL at 22:11

## 2018-12-05 RX ADMIN — RISPERIDONE 2 MG: 2 TABLET ORAL at 22:10

## 2018-12-05 RX ADMIN — METRONIDAZOLE: 7.5 GEL TOPICAL at 09:22

## 2018-12-05 RX ADMIN — ACETAMINOPHEN 325 MG: 325 TABLET ORAL at 09:22

## 2018-12-05 RX ADMIN — LORAZEPAM 0.5 MG: 0.5 TABLET ORAL at 17:45

## 2018-12-05 RX ADMIN — CEFTRIAXONE SODIUM 1 G: 1 INJECTION, POWDER, FOR SOLUTION INTRAMUSCULAR; INTRAVENOUS at 13:55

## 2018-12-05 RX ADMIN — CLINDAMYCIN HYDROCHLORIDE 450 MG: 150 CAPSULE ORAL at 22:10

## 2018-12-05 RX ADMIN — Medication 10 ML: at 13:56

## 2018-12-05 RX ADMIN — ENOXAPARIN SODIUM 40 MG: 100 INJECTION SUBCUTANEOUS at 13:56

## 2018-12-05 RX ADMIN — CLINDAMYCIN HYDROCHLORIDE 450 MG: 150 CAPSULE ORAL at 06:11

## 2018-12-05 RX ADMIN — LORAZEPAM 0.5 MG: 0.5 TABLET ORAL at 09:22

## 2018-12-05 RX ADMIN — LEVOTHYROXINE SODIUM 50 MCG: 50 TABLET ORAL at 06:11

## 2018-12-05 NOTE — PROGRESS NOTES
Bedside and Verbal shift change report given to OSMAR Elder (oncoming nurse) by Suma Tomlin RN (offgoing nurse). Report included the following information SBAR, Kardex, Intake/Output and MAR.

## 2018-12-05 NOTE — PROGRESS NOTES
Intern Progress Note 120 [facility] Patient: Ashish Velasco MRN: [id number]  CSN: [id number] YOB: 1955  Age: 70 y.o. Sex: female DOA: 11/27/2018 LOS:  LOS: 8 days Subjective:  
 
Acute events: Patient is doing better than yesterday. Her fever has resolved and she is eating well. ROS limited given patient's chronic medical condition. Objective:  
  
Patient Vitals for the past 24 hrs: 
 Temp Pulse Resp BP SpO2  
12/05/18 0649 98.5 °F (36.9 °C) 96 20 139/84 92 % 12/05/18 0417 99.8 °F (37.7 °C) 93 17 133/66 93 % 12/05/18 0114 99.4 °F (37.4 °C) 96 16 122/71 93 % 12/04/18 1825 99.8 °F (37.7 °C) 88 16 120/79 96 % 12/04/18 1748 98.7 °F (37.1 °C)      
12/04/18 1534 100.2 °F (37.9 °C) 97 22 102/67 96 % 12/04/18 1200 (!) 100.5 °F (38.1 °C)      
12/04/18 1105 (!) 100.8 °F (38.2 °C) (!) 101 26 134/69 91 % Intake/Output Summary (Last 24 hours) at 12/5/2018 5773 Last data filed at 12/5/2018 4027 Gross per 24 hour Intake 637 ml Output 600 ml Net 37 ml Physical Exam:  
General:  Alert and non-verbal at baseline and in No acute distress. Unable to follow commands. CV:  RRR, no murmurs, rubs, or gallops RESP:  Unlabored breathing. Lungs clear to auscultation. No wheeze, rales, or rhonchi. ABD:  Soft, nontender, mildly distended. No hepatosplenomegaly. No suprapubic tenderness. MS:  Contorted lower extremities bilaterally. Neuro:  Unable to assess given patient's condition. Ext:  No edema. 2+ radial and dp pulses bilaterally. Skin:  No rashes, lesions, or ulcers.   
  
Lab/Data Reviewed: 
Recent Results (from the past 12 hour(s)) URINALYSIS W/ RFLX MICROSCOPIC Collection Time: 12/04/18  9:00 PM  
Result Value Ref Range Color YELLOW Appearance CLOUDY Specific gravity 1.017 1.005 - 1.030    
 pH (UA) 7.5 5.0 - 8.0 Protein NEGATIVE  NEG mg/dL Glucose NEGATIVE  NEG mg/dL Ketone NEGATIVE  NEG mg/dL Bilirubin NEGATIVE  NEG Blood NEGATIVE  NEG Urobilinogen 1.0 0.2 - 1.0 EU/dL Nitrites NEGATIVE  NEG Leukocyte Esterase SMALL (A) NEG    
LACTIC ACID Collection Time: 12/04/18  9:00 PM  
Result Value Ref Range Lactic acid 1.8 0.4 - 2.0 MMOL/L  
URINE MICROSCOPIC ONLY Collection Time: 12/04/18  9:00 PM  
Result Value Ref Range WBC 3 to 5 0 - 4 /hpf  
 RBC NONE 0 - 5 /hpf Epithelial cells 3+ 0 - 5 /lpf Bacteria 1+ (A) NEG /hpf METABOLIC PANEL, BASIC Collection Time: 12/05/18  3:10 AM  
Result Value Ref Range Sodium 140 136 - 145 mmol/L Potassium 4.2 3.5 - 5.5 mmol/L Chloride 106 100 - 108 mmol/L  
 CO2 23 21 - 32 mmol/L Anion gap 11 3.0 - 18 mmol/L Glucose 99 74 - 99 mg/dL BUN 14 7.0 - 18 MG/DL Creatinine 0.44 (L) 0.6 - 1.3 MG/DL  
 BUN/Creatinine ratio 32 (H) 12 - 20 GFR est AA >60 >60 ml/min/1.73m2 GFR est non-AA >60 >60 ml/min/1.73m2 Calcium 9.2 8.5 - 10.1 MG/DL  
CBC WITH AUTOMATED DIFF Collection Time: 12/05/18  3:10 AM  
Result Value Ref Range WBC 11.6 4.6 - 13.2 K/uL  
 RBC 4.19 (L) 4.20 - 5.30 M/uL  
 HGB 12.6 12.0 - 16.0 g/dL HCT 38.5 35.0 - 45.0 % MCV 91.9 74.0 - 97.0 FL  
 MCH 30.1 24.0 - 34.0 PG  
 MCHC 32.7 31.0 - 37.0 g/dL  
 RDW 14.4 11.6 - 14.5 % PLATELET 052 778 - 254 K/uL MPV 9.3 9.2 - 11.8 FL  
 NEUTROPHILS 50 42 - 75 % BAND NEUTROPHILS 10 (H) 0 - 5 % LYMPHOCYTES 26 20 - 51 % MONOCYTES 13 (H) 2 - 9 % EOSINOPHILS 0 0 - 5 % BASOPHILS 0 0 - 3 % METAMYELOCYTES 1 (H) 0 %  
 ABS. NEUTROPHILS 7.0 1.8 - 8.0 K/UL  
 ABS. LYMPHOCYTES 3.0 0.8 - 3.5 K/UL  
 ABS. MONOCYTES 1.5 (H) 0 - 1.0 K/UL  
 ABS. EOSINOPHILS 0.0 0.0 - 0.4 K/UL  
 ABS. BASOPHILS 0.0 0.0 - 0.06 K/UL  
 DF MANUAL PLATELET COMMENTS ADEQUATE PLATELETS    
 RBC COMMENTS NORMOCYTIC, NORMOCHROMIC Scheduled Medications Reviewed: 
Current Facility-Administered Medications Medication Dose Route Frequency  clindamycin (CLEOCIN) capsule 450 mg  450 mg Oral Q8H  
 levothyroxine (SYNTHROID) tablet 50 mcg  50 mcg Oral 6am  
 enoxaparin (LOVENOX) injection 40 mg  40 mg SubCUTAneous Q24H  
 baclofen (LIORESAL) tablet 20 mg  20 mg Oral Q8H  
 divalproex (DEPAKOTE SPRINKLE) capsule 1,000 mg  1,000 mg Oral QHS  loratadine (CLARITIN) tablet 10 mg  10 mg Oral DAILY  LORazepam (ATIVAN) tablet 0.5 mg  0.5 mg Oral BID  metroNIDAZOLE (METROGEL) 0.75 % gel   Topical DAILY  polyethylene glycol (MIRALAX) packet 17 g  17 g Oral DAILY  risperiDONE (RisperDAL) tablet 2 mg  2 mg Oral QHS Imaging, microbiology, and EKG/Telemetry: Xr Chest Pa Lat Result Date: 12/4/2018 Impression: Limited study due to patient's size. Cardiomegaly. Low lung volumes. Possible mild pulmonary vascular congestion. No convincing evidence of pneumonia. Assessment/Plan  
70 y.o. female with PMH Non-verbal, Diplegic Cerebral Palsy, Seizure Disorder, Intellectual Disability, Hypothyroidism, and Chronic Constipation, now admitted with possible aspiration pneumonia. 
  
Aspiration with Concerns of Hemodynamic Instability: Patient is doing better than on admission. She has not had another episode of aspiration. No G-tube needed at this time. - Continue oral Clindamycin today for a 14 day treatment (End date: 12/10) - Blood Cultures Negative - Diet: Puree - Fall/Aspiration Precautions - Discuss overnight home oxygen test 
  
Diplegic Cerebral Palsy/Intellectual Disability/Behavior Problem: Wheelchair Bound 
- Hold home Gabapentin 600mg BID  
- Continue Risperidone 2 mg at bedtime  
- Continue home Baclofen 20mg QID 
- Repositioning q1h to prevent bed sores 
  
Seizure Disorder: No recent episodes of seizures. No seizure-like activity while inpatient. - Continue Valproic Acid Delayed Release Sprinkle 1000 mg at bedtime  
  
Hypothyroidism: Most recent TSH in March 2018 was wnl. - Continue home Levothyroxine 50 mcg daily 
  
Chronic Constipation:  
- Continue home Miralax 
  
Diet: Puree/Honey-Thick Liquid Diet DVT Prophylaxis: Lovenox Code Status: FULL Point of Contact: Prudence Mari [phone] (Relationship: Supervisor at [facility] (RN)) 
  
Disposition and anticipated LOS: Home today, tentatively [facility]. Shan Severino MD, PGY-1 
[address] Medicine 12/05/18 10:11 AM

## 2018-12-05 NOTE — ROUTINE PROCESS
Bedside and Verbal shift change report given to [address] [place] [address] (oncoming nurse) by Rosina Rose RN (offgoing nurse). Report included the following information SBAR, Kardex, Intake/Output and MAR.

## 2018-12-05 NOTE — ROUTINE PROCESS
Bedside and Verbal shift change report given to [address] [place] [address] (oncoming nurse) by Marleen Oshea RN (offgoing nurse). Report included the following information SBAR, Kardex, Intake/Output and MAR.

## 2018-12-05 NOTE — PROGRESS NOTES
S: Stopped to check on patient this evening. She was taking her medications with her nurse, resting comfortably in her bed. O: VS: Temp 99.8, HR 88, /79, RR 16, 96% on RA 
GEN: Awake and alert, NAD 
CV: rrr, no m/r/g PULM: CTAB, no r/r/w ABD: soft, non-tender, non-distended Lactic Acid- 1.8, UA- negative for protein/glucose/ketone/blood/nitries, small leuk est w/ 1+ bact, Urine cx- pending A: 68yo female who was admitted for aspiration pneumonia, discharge was held today due to fevers, appears well on exam today, VSS, UA is unequivocal.  
 
P: Follow up on urine culture, will defer UTI treatment to the day team as long as patient is afebrile with stable vitals and reassuring exam. 
 
 
Lea Zuniga D.O.  
[facility], PGY-1

## 2018-12-05 NOTE — PROGRESS NOTES
Urine culture demonstrated 80,000 CFU of GNR. Given the U/A with LE positivity and 1+ bacteria, as well as the 10% bandemia, elevated HR yesterday, increased RR and mild fever, Ms. Gerhardt Punch may have a UTI. Currently on Clindamycin. Will add Ceftriaxone 1g q24 hours in addition to clindamycin for now and await speciation/susceptibilities. Getachew Matias MD, PGY-3 
[address] [place] December 5, 2018 12:40 PM

## 2018-12-06 VITALS
HEART RATE: 87 BPM | TEMPERATURE: 98.6 F | RESPIRATION RATE: 18 BRPM | BODY MASS INDEX: 37.78 KG/M2 | HEIGHT: 59 IN | SYSTOLIC BLOOD PRESSURE: 108 MMHG | OXYGEN SATURATION: 91 % | WEIGHT: 187.39 LBS | DIASTOLIC BLOOD PRESSURE: 69 MMHG

## 2018-12-06 LAB
ANION GAP SERPL CALC-SCNC: 9 MMOL/L (ref 3–18)
BASOPHILS # BLD: 0 K/UL (ref 0–0.06)
BASOPHILS NFR BLD: 0 % (ref 0–3)
BUN SERPL-MCNC: 23 MG/DL (ref 7–18)
BUN/CREAT SERPL: 40 (ref 12–20)
CALCIUM SERPL-MCNC: 9.5 MG/DL (ref 8.5–10.1)
CHLORIDE SERPL-SCNC: 106 MMOL/L (ref 100–108)
CO2 SERPL-SCNC: 26 MMOL/L (ref 21–32)
CREAT SERPL-MCNC: 0.57 MG/DL (ref 0.6–1.3)
DIFFERENTIAL METHOD BLD: ABNORMAL
EOSINOPHIL # BLD: 0.1 K/UL (ref 0–0.4)
EOSINOPHIL NFR BLD: 1 % (ref 0–5)
ERYTHROCYTE [DISTWIDTH] IN BLOOD BY AUTOMATED COUNT: 14.8 % (ref 11.6–14.5)
GLUCOSE SERPL-MCNC: 103 MG/DL (ref 74–99)
HCT VFR BLD AUTO: 39.3 % (ref 35–45)
HGB BLD-MCNC: 12.6 G/DL (ref 12–16)
LYMPHOCYTES # BLD: 1.6 K/UL (ref 0.8–3.5)
LYMPHOCYTES NFR BLD: 16 % (ref 20–51)
MCH RBC QN AUTO: 30 PG (ref 24–34)
MCHC RBC AUTO-ENTMCNC: 32.1 G/DL (ref 31–37)
MCV RBC AUTO: 93.6 FL (ref 74–97)
MONOCYTES # BLD: 1.2 K/UL (ref 0–1)
MONOCYTES NFR BLD: 12 % (ref 2–9)
NEUTS BAND NFR BLD MANUAL: 2 % (ref 0–5)
NEUTS SEG # BLD: 7.2 K/UL (ref 1.8–8)
NEUTS SEG NFR BLD: 68 % (ref 42–75)
OTHER CELLS NFR BLD MANUAL: 1 %
PLATELET # BLD AUTO: 339 K/UL (ref 135–420)
PLATELET COMMENTS,PCOM: ABNORMAL
PMV BLD AUTO: 9 FL (ref 9.2–11.8)
POTASSIUM SERPL-SCNC: 4.1 MMOL/L (ref 3.5–5.5)
RBC # BLD AUTO: 4.2 M/UL (ref 4.2–5.3)
RBC MORPH BLD: ABNORMAL
SODIUM SERPL-SCNC: 141 MMOL/L (ref 136–145)
WBC # BLD AUTO: 10.3 K/UL (ref 4.6–13.2)

## 2018-12-06 PROCEDURE — 77030011256 HC DRSG MEPILEX <16IN NO BORD MOLN -A

## 2018-12-06 PROCEDURE — 80048 BASIC METABOLIC PNL TOTAL CA: CPT

## 2018-12-06 PROCEDURE — 74011250636 HC RX REV CODE- 250/636: Performed by: STUDENT IN AN ORGANIZED HEALTH CARE EDUCATION/TRAINING PROGRAM

## 2018-12-06 PROCEDURE — 74011250637 HC RX REV CODE- 250/637: Performed by: STUDENT IN AN ORGANIZED HEALTH CARE EDUCATION/TRAINING PROGRAM

## 2018-12-06 PROCEDURE — 36415 COLL VENOUS BLD VENIPUNCTURE: CPT

## 2018-12-06 PROCEDURE — 74011000250 HC RX REV CODE- 250: Performed by: STUDENT IN AN ORGANIZED HEALTH CARE EDUCATION/TRAINING PROGRAM

## 2018-12-06 PROCEDURE — 74011250636 HC RX REV CODE- 250/636: Performed by: FAMILY MEDICINE

## 2018-12-06 PROCEDURE — 85025 COMPLETE CBC W/AUTO DIFF WBC: CPT

## 2018-12-06 PROCEDURE — 74011000250 HC RX REV CODE- 250: Performed by: FAMILY MEDICINE

## 2018-12-06 PROCEDURE — 77030038269 HC DRN EXT URIN PURWCK BARD -A

## 2018-12-06 RX ORDER — CLINDAMYCIN HYDROCHLORIDE 150 MG/1
CAPSULE ORAL
Qty: 13 CAP | Refills: 0 | Status: SHIPPED | OUTPATIENT
Start: 2018-12-06 | End: 2020-06-02

## 2018-12-06 RX ORDER — CEFPODOXIME PROXETIL 100 MG/1
100 TABLET, FILM COATED ORAL 2 TIMES DAILY
Qty: 3 TAB | Refills: 0 | Status: SHIPPED | OUTPATIENT
Start: 2018-12-06 | End: 2020-06-02

## 2018-12-06 RX ADMIN — BACLOFEN 20 MG: 10 TABLET ORAL at 14:07

## 2018-12-06 RX ADMIN — METRONIDAZOLE: 7.5 GEL TOPICAL at 10:22

## 2018-12-06 RX ADMIN — CLINDAMYCIN HYDROCHLORIDE 450 MG: 150 CAPSULE ORAL at 14:07

## 2018-12-06 RX ADMIN — POLYETHYLENE GLYCOL 3350 17 G: 17 POWDER, FOR SOLUTION ORAL at 09:00

## 2018-12-06 RX ADMIN — LORATADINE 10 MG: 10 TABLET ORAL at 10:22

## 2018-12-06 RX ADMIN — CEFTRIAXONE SODIUM 1 G: 1 INJECTION, POWDER, FOR SOLUTION INTRAMUSCULAR; INTRAVENOUS at 12:42

## 2018-12-06 RX ADMIN — ENOXAPARIN SODIUM 40 MG: 100 INJECTION SUBCUTANEOUS at 12:45

## 2018-12-06 RX ADMIN — LEVOTHYROXINE SODIUM 50 MCG: 50 TABLET ORAL at 05:42

## 2018-12-06 RX ADMIN — LORAZEPAM 0.5 MG: 0.5 TABLET ORAL at 10:22

## 2018-12-06 RX ADMIN — CLINDAMYCIN HYDROCHLORIDE 450 MG: 150 CAPSULE ORAL at 05:42

## 2018-12-06 RX ADMIN — BACLOFEN 20 MG: 10 TABLET ORAL at 05:42

## 2018-12-06 RX ADMIN — ACETAMINOPHEN 325 MG: 325 TABLET ORAL at 05:44

## 2018-12-06 NOTE — PROGRESS NOTES
Called Roby Lomeli. Roby Andrews know that we will discharge Ms. Dutta Prudent today. Plan is for patient to be discharged with third generation PO antibiotic. She will get her second CTX dose this afternoon before discharge. She will also need to continue PO Clindamycin for the aspiration PNA. PFM to touch base with CM. She still needs her home O2 (that was discussed earlier this week). Dean Vazquez MD, PGY-3 
[address] [place] December 6, 2018 11:46 AM

## 2018-12-06 NOTE — PROGRESS NOTES
Per Aditya) called [facility] for stretcher transport spoke with Verena Osei, patient is scheduled for 3:30pm. Transport to residence ([address] [place] [address]). Informed Carla Hummel of transportation arrangements.

## 2018-12-06 NOTE — PROGRESS NOTES
Alert, NAD, incontinence care given and tolerated well. Skin: intact, warm and dry. Foam silicone dressings applied to both elbows, sacrum, and bilateral heels for pressure ulcer prevention.

## 2018-12-06 NOTE — PROGRESS NOTES
Per Ankita(MARIJA) CMS will not be able to review and explain Important Message from Medicare with patient at bedside, patient will not be able to acknowledge an understanding due to medical condition. A copy provided to patient left at bedside. CMS unable to obtain signature, patient unable

## 2018-12-06 NOTE — DISCHARGE INSTRUCTIONS
Aspiration Pneumonia: Care Instructions  Your Care Instructions    Aspiration pneumonia is an inflammation of the lungs. It may occur after you breathe in large amounts of foreign material, such as food, liquid, vomit, or mucus. Aspiration may happen because of a health problem that makes it hard to swallow. These problems include stroke or seizure. Pneumonia makes it hard to breathe. Follow-up care is a key part of your treatment and safety. Be sure to make and go to all appointments, and call your doctor if you are having problems. It's also a good idea to know your test results and keep a list of the medicines you take. How can you care for yourself at home? To help with swallowing  · You may need to do exercises to train your muscles to work together to help you swallow. You may also need to learn how to position your body or how to put food in your mouth to be able to swallow better. · You may need to change the foods you eat. Your doctor may tell you to eat certain foods and liquids to make swallowing easier. · You may need to change how you prepare foods. For example, you may need to add thickeners to some liquids, or puree certain foods in a . To help with pneumonia  · Take your antibiotics as directed. Do not stop taking them just because you feel better. You need to take the full course of antibiotics. · Take your medicines exactly as prescribed. For example, your doctor may have given you medicine that makes breathing easier. Call your doctor if you think you are having a problem with your medicine. · Get plenty of rest and sleep. You may feel weak and tired for a while, but your energy level will improve with time. · Take care of your cough so you can rest. A cough that brings up mucus from your lungs is common with pneumonia. It is one way your body gets rid of the infection. But if coughing keeps you from resting or causes severe fatigue and chest-wall pain, talk to your doctor. He or she may suggest that you take a medicine to reduce the cough. · Use a humidifier to increase the moisture in the air. Dry air makes coughing worse. Follow the instructions for cleaning the machine. · Do not smoke, and avoid others' smoke. Smoke will make your cough last longer. If you need help quitting, talk to your doctor about stop-smoking programs and medicines. These can increase your chances of quitting for good. · Take an over-the-counter pain medicine, such as acetaminophen (Tylenol), ibuprofen (Advil, Motrin), or naproxen (Aleve) to help reduce fever and reduce chest pain caused by coughing. Read and follow all instructions on the label. · Do not take two or more pain medicines at the same time unless the doctor told you to. Many pain medicines have acetaminophen, which is Tylenol. Too much acetaminophen (Tylenol) can be harmful. When should you call for help? Call 911 anytime you think you may need emergency care. For example, call if:    · You have severe trouble breathing.    Call your doctor now or seek immediate medical care if:    · You have a new or higher fever.     · You have new or worse trouble breathing.     · You cough up blood.     · You are dizzy or lightheaded, or you feel like you may faint.    Watch closely for changes in your health, and be sure to contact your doctor if:    · You do not get better as expected.     · You are coughing more deeply or more often. Where can you learn more? Go to http://elena-siobhan.info/. Enter 23801 [id number] in the search box to learn more about \"Aspiration Pneumonia: Care Instructions. \"  Current as of: December 6, 2017  Content Version: 11.8  © 7550-3134 Healthwise, Incorporated. Care instructions adapted under license by [employer] (which disclaims liability or warranty for this information).  If you have questions about a medical condition or this instruction, always ask your healthcare professional. [employer] disclaims any warranty or liability for your use of this information. [employer] Activation    Thank you for requesting access to Ne[email or web address]. Please follow the instructions below to securely access and download your online medical record. Neptune[employer]io allows you to send messages to your doctor, view your test results, renew your prescriptions, schedule appointments, and more. How Do I Sign Up? 1. In your internet browser, go to [email or web address]  2. Click on the First Time User? Click Here link in the Sign In box. You will be redirect to the New Member Sign Up page. 3. Enter your Ne[employer] Access Code exactly as it appears below. You will not need to use this code after youve completed the sign-up process. If you do not sign up before the expiration date, you must request a new code. [email or web address] Access Code: [email or web address]  Expires: 2019  7:02 AM (This is the date your Ne[employer]io access code will )    4. Enter the last four digits of your Social Security Number (xxxx) and Date of Birth (mm/dd/yyyy) as indicated and click Submit. You will be taken to the next sign-up page. 5. Create a Neptune.io ID. This will be your Ne[employer].io login ID and cannot be changed, so think of one that is secure and easy to remember. 6. Create a Ne[employer]io password. You can change your password at any time. 7. Enter your Password Reset Question and Answer. This can be used at a later time if you forget your password. 8. Enter your e-mail address. You will receive e-mail notification when new information is available in [address] 9. Click Sign Up. You can now view and download portions of your medical record. 10. Click the Download Summary menu link to download a portable copy of your medical information. Additional Information    If you have questions, please visit the Frequently Asked Questions section of the [email or web address] website at [email or web address] Remember, [email or web address] is NOT to be used for urgent needs. For medical emergencies, dial 911. Patient armband removed and shredded    DISCHARGE SUMMARY from Nurse    PATIENT INSTRUCTIONS:    After general anesthesia or intravenous sedation, for 24 hours or while taking prescription Narcotics:  · Limit your activities  · Do not drive and operate hazardous machinery  · Do not make important personal or business decisions  · Do  not drink alcoholic beverages  · If you have not urinated within 8 hours after discharge, please contact your surgeon on call. Report the following to your surgeon:  · Excessive pain, swelling, redness or odor of or around the surgical area  · Temperature over 100.5  · Nausea and vomiting lasting longer than 4 hours or if unable to take medications  · Any signs of decreased circulation or nerve impairment to extremity: change in color, persistent  numbness, tingling, coldness or increase pain  · Any questions    What to do at Home:  Recommended activity: Activity as tolerated    If you experience any of the following symptoms Nausea, vomiting, shortness of breath, chest pain, fever greater than 100.5, please follow up with PCP. *  Please give a list of your current medications to your Primary Care Provider. *  Please update this list whenever your medications are discontinued, doses are      changed, or new medications (including over-the-counter products) are added. *  Please carry medication information at all times in case of emergency situations. These are general instructions for a healthy lifestyle:    No smoking/ No tobacco products/ Avoid exposure to second hand smoke  Surgeon General's Warning:  Quitting smoking now greatly reduces serious risk to your health.     Obesity, smoking, and sedentary lifestyle greatly increases your risk for illness    A healthy diet, regular physical exercise & weight monitoring are important for maintaining a healthy lifestyle    You may be retaining fluid if you have a history of heart failure or if you experience any of the following symptoms:  Weight gain of 3 pounds or more overnight or 5 pounds in a week, increased swelling in our hands or feet or shortness of breath while lying flat in bed. Please call your doctor as soon as you notice any of these symptoms; do not wait until your next office visit. Recognize signs and symptoms of STROKE:    F-face looks uneven    A-arms unable to move or move unevenly    S-speech slurred or non-existent    T-time-call 911 as soon as signs and symptoms begin-DO NOT go       Back to bed or wait to see if you get better-TIME IS BRAIN. Warning Signs of HEART ATTACK     Call 911 if you have these symptoms:   Chest discomfort. Most heart attacks involve discomfort in the center of the chest that lasts more than a few minutes, or that goes away and comes back. It can feel like uncomfortable pressure, squeezing, fullness, or pain.  Discomfort in other areas of the upper body. Symptoms can include pain or discomfort in one or both arms, the back, neck, jaw, or stomach.  Shortness of breath with or without chest discomfort.  Other signs may include breaking out in a cold sweat, nausea, or lightheadedness. Don't wait more than five minutes to call 911 - MINUTES MATTER! Fast action can save your life. Calling 911 is almost always the fastest way to get lifesaving treatment. Emergency Medical Services staff can begin treatment when they arrive -- up to an hour sooner than if someone gets to the hospital by car. The discharge information has been reviewed with the patient. The patient verbalized understanding. Discharge medications reviewed with the patient and appropriate educational materials and side effects teaching were provided.   ___________________________________________________________________________________________________________________________________

## 2018-12-06 NOTE — PROGRESS NOTES
Intern Progress Note [facility] Patient: Wilma Cardenas MRN: [id number]  CSN: [id number] YOB: 1955  Age: 70 y.o. Sex: female DOA: 11/27/2018 LOS:  LOS: 9 days Subjective:  
 
Acute events: Patient is doing well today. No acute events overnight. ROS limited given patient's chronic medical condition. Objective:  
  
Patient Vitals for the past 24 hrs: 
 Temp Pulse Resp BP SpO2  
12/06/18 0645 98.5 °F (36.9 °C) 73 20 119/63 92 % 12/06/18 0231 99.6 °F (37.6 °C) 92 20 148/80 95 % 12/05/18 1836 99.8 °F (37.7 °C) 98  127/80 94 % 12/05/18 1200 98.4 °F (36.9 °C) 85 24 109/64 93 % Intake/Output Summary (Last 24 hours) at 12/6/2018 3909 Last data filed at 12/6/2018 0800 Gross per 24 hour Intake 250 ml Output 1200 ml Net -950 ml Physical Exam:  
General:  Alert and non-verbal at baseline and in No acute distress. Unable to follow commands. CV:  RRR, no murmurs, rubs, or gallops RESP:  Unlabored breathing. Lungs clear to auscultation. No wheeze, rales, or rhonchi. ABD:  Soft, nontender, mildly distended. No hepatosplenomegaly. No suprapubic tenderness. MS:  Contorted lower extremities bilaterally. Neuro:  Unable to assess given patient's condition. Ext:  No edema. 2+ radial and dp pulses bilaterally. Skin:  No rashes, lesions, or ulcers.   
  
Lab/Data Reviewed: 
Recent Results (from the past 12 hour(s)) METABOLIC PANEL, BASIC Collection Time: 12/06/18  2:15 AM  
Result Value Ref Range Sodium 141 136 - 145 mmol/L Potassium 4.1 3.5 - 5.5 mmol/L Chloride 106 100 - 108 mmol/L  
 CO2 26 21 - 32 mmol/L Anion gap 9 3.0 - 18 mmol/L Glucose 103 (H) 74 - 99 mg/dL BUN 23 (H) 7.0 - 18 MG/DL Creatinine 0.57 (L) 0.6 - 1.3 MG/DL  
 BUN/Creatinine ratio 40 (H) 12 - 20 GFR est AA >60 >60 ml/min/1.73m2 GFR est non-AA >60 >60 ml/min/1.73m2  Calcium 9.5 8.5 - 10.1 MG/DL  
CBC WITH AUTOMATED DIFF  
 Collection Time: 12/06/18  2:15 AM  
Result Value Ref Range WBC 10.3 4.6 - 13.2 K/uL  
 RBC 4.20 4. 20 - 5.30 M/uL  
 HGB 12.6 12.0 - 16.0 g/dL HCT 39.3 35.0 - 45.0 % MCV 93.6 74.0 - 97.0 FL  
 MCH 30.0 24.0 - 34.0 PG  
 MCHC 32.1 31.0 - 37.0 g/dL  
 RDW 14.8 (H) 11.6 - 14.5 % PLATELET 183 569 - 509 K/uL MPV 9.0 (L) 9.2 - 11.8 FL  
 NEUTROPHILS 68 42 - 75 % BAND NEUTROPHILS 2 0 - 5 % LYMPHOCYTES 16 (L) 20 - 51 % MONOCYTES 12 (H) 2 - 9 % EOSINOPHILS 1 0 - 5 % BASOPHILS 0 0 - 3 % OTHER CELL 1 (H) 0    
 ABS. NEUTROPHILS 7.2 1.8 - 8.0 K/UL  
 ABS. LYMPHOCYTES 1.6 0.8 - 3.5 K/UL  
 ABS. MONOCYTES 1.2 (H) 0 - 1.0 K/UL  
 ABS. EOSINOPHILS 0.1 0.0 - 0.4 K/UL  
 ABS. BASOPHILS 0.0 0.0 - 0.06 K/UL  
 DF MANUAL PLATELET COMMENTS ADEQUATE PLATELETS    
 RBC COMMENTS ANISOCYTOSIS 
SLIGHT Scheduled Medications Reviewed: 
Current Facility-Administered Medications Medication Dose Route Frequency  cefTRIAXone (ROCEPHIN) 1 g in sterile water (preservative free) 10 mL IV syringe  1 g IntraVENous Q24H  clindamycin (CLEOCIN) capsule 450 mg  450 mg Oral Q8H  
 levothyroxine (SYNTHROID) tablet 50 mcg  50 mcg Oral 6am  
 enoxaparin (LOVENOX) injection 40 mg  40 mg SubCUTAneous Q24H  
 baclofen (LIORESAL) tablet 20 mg  20 mg Oral Q8H  
 divalproex (DEPAKOTE SPRINKLE) capsule 1,000 mg  1,000 mg Oral QHS  loratadine (CLARITIN) tablet 10 mg  10 mg Oral DAILY  LORazepam (ATIVAN) tablet 0.5 mg  0.5 mg Oral BID  metroNIDAZOLE (METROGEL) 0.75 % gel   Topical DAILY  polyethylene glycol (MIRALAX) packet 17 g  17 g Oral DAILY  risperiDONE (RisperDAL) tablet 2 mg  2 mg Oral QHS Imaging, microbiology, and EKG/Telemetry: No results found.  
 
Assessment/Plan  
70 y.o. female with PMH Non-verbal, Diplegic Cerebral Palsy, Seizure Disorder, Intellectual Disability, Hypothyroidism, and Chronic Constipation, now admitted with possible aspiration pneumonia. 
  
 Aspiration with Concerns of Hemodynamic Instability: Patient is doing better than on admission. She has not had another episode of aspiration. No G-tube needed at this time. - Continue oral Clindamycin today for a 14 day treatment (End date: 12/10) - Blood Cultures Negative - Diet: Puree - Fall/Aspiration Precautions - Discuss overnight home oxygen test 
  
Diplegic Cerebral Palsy/Intellectual Disability/Behavior Problem: Wheelchair Bound 
- Hold home Gabapentin 600mg BID  
- Continue Risperidone 2 mg at bedtime  
- Continue home Baclofen 20mg QID 
- Repositioning q1h to prevent bed sores 
  
Seizure Disorder: No recent episodes of seizures. No seizure-like activity while inpatient. - Continue Valproic Acid Delayed Release Sprinkle 1000 mg at bedtime  
  
Hypothyroidism: Most recent TSH in March 2018 was wnl.  
- Continue home Levothyroxine 50 mcg daily 
  
Chronic Constipation:  
- Continue home Miralax 
  
Diet: Puree/Honey-Thick Liquid Diet DVT Prophylaxis: Lovenox Code Status: FULL Point of Contact: Judus Baltazarpamela [phone] (Relationship: Supervisor at [facility] (RN)) 
  
Disposition and anticipated LOS: Home today, tentatively [facility] [place]. Ezequiel Martinez MD, PGY-1 
[address] Medicine 12/06/18 10:11 AM

## 2018-12-06 NOTE — PROGRESS NOTES
D/C order noted for today. Confirmed with Radha Combs that bed is available today at group home. Transport to facility has been arranged through [address] [place] [address],[address] at 3:30pm. Transport pt to [address] [place] [address] 45 per Ronak Ewing. Bedside RN has been updated to the plan. Please call report to [phone]. Notified Kate Wilson ([phone]) with [employer] to deliver oxygen. Radha Combs requested discharge information to be faxed to [phone]. 
 
JANIS Connor, RN Pager # [phone] Care Manager

## 2018-12-07 ENCOUNTER — PATIENT OUTREACH (OUTPATIENT)
Dept: FAMILY MEDICINE CLINIC | Age: 63
End: 2018-12-07

## 2018-12-07 NOTE — PROGRESS NOTES
Hospital Discharge Follow-Up Date/Time:  2018 9:41 AM 
 
Patient was admitted to [facility] on 18 and discharged on 18 for Aspiration pneumonia and hypoxia. The physician discharge summary was not available at the time of outreach. Patient was contacted within 1 business days of discharge. Patient lives at [facility]. I spoke with the supervisor Danielle Aschoff. She states she states Kelli Eugene is doing okay. There is some confusion with the O2 order. She states she was told to use O2 at night. She states Kelli Eugene will not keep on the O2 that's why she was restrained in the hospital.The order is for continuous. She is asking for a call from the physician. I will speak with Denise Cross to see what can be done. See hospital summary in chart. Top Challenges reviewed with the provider  
 
none Method of communication with provider :none Inpatient RRAT score: Low Risk 3 Total Score 3 Has Seen PCP in Last 6 Months (Yes=3, No=0) Criteria that do not apply:  
 . Living with Significant Other. Assisted Living. LTAC. SNF. or  
Rehab Patient Length of Stay (>5 days = 3) IP Visits Last 12 Months (1-3=4, 4=9, >4=11) Pt. Coverage (Medicare=5 , Medicaid, or Self-Pay=4) Charlson Comorbidity Score (Age + Comorbid Conditions) Was this a readmission? no  
Patient stated reason for the readmission: n/a Nurse Navigator (NN) contacted the caregiver by telephone to perform post hospital discharge assessment. Verified name and  with caregiver as identifiers. Provided introduction to self, and explanation of the Nurse Navigator role. Reviewed discharge instructions and red flags with caregiver who verbalized understanding. Caregiver given an opportunity to ask questions and does not have any further questions or concerns at this time.  The caregiver agrees to contact the PCP office for questions related to their healthcare. NN provided contact information for future reference. Disease Specific:   N/A Summary of patient's top problems: 1. Pneumonia 2. O2 therapy 3. Cerebral Palsy Home Health orders at discharge: none [address]: n/a Date of initial visit: n/a Durable Medical Equipment ordered/company: [employer] Durable Medical Equipment received: O2 Barriers to care? none Advance Care Planning:  
Does patient have an Advance Directive:  not on file Medication(s):  
New Medications at Discharge: Clindamycin, Vantin Changed Medications at Discharge:none Discontinued Medications at Discharge: none Medication reconciliation was performed with caregiver, who verbalizes understanding of administration of home medications. There were no barriers to obtaining medications identified at this time. Referral to Pharm D needed: no  
 
Current Outpatient Medications Medication Sig  
 clindamycin (CLEOCIN) 150 mg capsule Please take one pill tonight, 12/6/18, and then 1 pill every 8 hours until completed.  cefpodoxime (VANTIN) 100 mg tablet Take 1 Tab by mouth two (2) times a day.  risperiDONE (RISPERDAL) 2 mg tablet Take 2 mg by mouth nightly.  metroNIDAZOLE (METROGEL) 0.75 % topical gel Apply  to affected area daily.  polyethylene glycol (MIRALAX) 17 gram packet Take 17 g by mouth daily.  gabapentin (NEURONTIN) 600 mg tablet Take  by mouth four (4) times daily.  loperamide (IMODIUM) 2 mg capsule Take 2 mg by mouth as needed for Diarrhea.  ibuprofen (MOTRIN) 200 mg tablet Take 200 mg by mouth every six (6) hours as needed for Pain.  divalproex (DEPAKOTE SPRINKLES) 125 mg capsule Take 1,000 mg by mouth nightly.  Estradiol (DIVIGEL) 0.5 (0.1) mg (%) GlPk by TransDERmal route. Apply to inner thigh @@ bedtime  baclofen (LIORESAL) 20 mg tablet Take 1 Tab by mouth every eight (8) hours.   
 HOMEOPATHIC DRUGS (OSTEO-KATIE PO) Take 500 mg by mouth two (2) times a day.  
 lorazepam (ATIVAN) 0.5 mg tablet Take 0.5 mg by mouth two (2) times a day.  loratadine (CLARITIN) 10 mg tablet Take 10 mg by mouth daily.  fluticasone (FLONASE) 50 mcg/Actuation nasal spray 2 Sprays by Nasal route daily. Administer to right and left nostril. No current facility-administered medications for this visit. There are no discontinued medications. [facility] follow up appointment(s): No future appointments. Non-[facility] follow up appointment(s): PCP 12/12/18 @ 1 pm 
[facility]:  n/a  
 
 
Goals  Instruct on red flags (ie. fever, increased productive cough, SOB, and chest pain) and when to seek urgent medical treatment. Facility will contact pcp or call 911 if symptoms worsens.  Knowledge and adherence to medication (ie. action, side effects, missed dose, use of devices) Patient will complete abt therapy as ordered

## 2018-12-07 NOTE — PROGRESS NOTES
received call from [facility] who reports concern over patients oxygen order. Asked this  to verify spoke with Dr. Evelia Cordon identified order was written continuous. Dr. Evelia Cordon stated verbal order to have oxygen PRN and nightly only. [facility] Nurse Navigator notified.

## 2018-12-07 NOTE — PROGRESS NOTES
Shalom Rojas with Dr. Marito Hargrove. He gave verbal order for O2  can be prn and nightly only. I spoke with Gigi Bowen at the group home. I relayed the message to her. She states she still needs a new order. I faxed her a copy of the message from Rama Cano to have on file until the order can be gotten.

## 2018-12-07 NOTE — DISCHARGE SUMMARY
Discharge Summary  [facility]      Patient: Josy Raygoza Age: 70 y.o. Sex: female  : 1955    MRN: [id number]      DOA: 2018      Discharge Date: 2018      Attending:No att. providers found      PCP: JADON Ayala        ================================================================    Reason for Admission:   Aspiration pneumonia [facility])  Hypoxia    Discharge Diagnoses:   Aspiration pneumonia ([facility] [address]  Cerebral palsy ([facility] [address]  Mental retardation  Encephalopathy secondary to Gabapentin and Hypoxia: Treated and resolved    Important notes to PCP/ follow-up studies and evaluations   - Gabapentin was decreased and then held during admission given patient's lethargy in the mornings. - Patient was approved for nightly oxygen because she had an Sp02 of 88% while sleeping. Pending labs and studies:  None. Operative Procedures:   None. Discharge Medications:     Discharge Medication List as of 2018  1:00 PM      START taking these medications    Details   clindamycin (CLEOCIN) 150 mg capsule Please take one pill tonight, 18, and then 1 pill every 8 hours until completed. , Print, Disp-13 Cap, R-0      cefpodoxime (VANTIN) 100 mg tablet Take 1 Tab by mouth two (2) times a day., Print, Disp-3 Tab, R-0         CONTINUE these medications which have NOT CHANGED    Details   risperiDONE (RISPERDAL) 2 mg tablet Take 2 mg by mouth nightly., Historical Med      metroNIDAZOLE (METROGEL) 0.75 % topical gel Apply  to affected area daily. , Historical Med      polyethylene glycol (MIRALAX) 17 gram packet Take 17 g by mouth daily. , Historical Med      gabapentin (NEURONTIN) 600 mg tablet Take  by mouth four (4) times daily. , Historical Med      loperamide (IMODIUM) 2 mg capsule Take 2 mg by mouth as needed for Diarrhea., Historical Med      ibuprofen (MOTRIN) 200 mg tablet Take 200 mg by mouth every six (6) hours as needed for Pain., Historical Med      divalproex (DEPAKOTE SPRINKLES) 125 mg capsule Take 1,000 mg by mouth nightly., Historical Med      fluticasone (FLONASE) 50 mcg/Actuation nasal spray 2 Sprays by Nasal route daily. Administer to right and left nostril., Historical Med      Estradiol (DIVIGEL) 0.5 (0.1) mg (%) GlPk Apply to inner thigh @@ bedtime TransDERmal, Until Discontinued, Historical Med      baclofen (LIORESAL) 20 mg tablet 20 mg, Oral, EVERY 8 HOURS Starting 8/25/2010, Until Discontinued, Disp-30 Tab, R-4, Normal      HOMEOPATHIC DRUGS (OSTEO-KATIE PO) 500 mg, Oral, 2 TIMES DAILY, Until Discontinued, Historical Med      lorazepam (ATIVAN) 0.5 mg tablet 0.5 mg, Oral, 2 TIMES DAILY Starting 5/26/2010, Until Discontinued, Historical Med      loratadine (CLARITIN) 10 mg tablet 10 mg, Oral, DAILY, Until Discontinued, Historical Med             Disposition: Group Home    Consultants:    Gastroenterology- Tamanna Lee NP    Brief Hospital Course (including pertinent history and physical findings)  Yann Singh is a 70 y.o. female with PMH of Non-verbal, Diplegic Cerebral Palsy, Seizure Disorder, Intellectual Disability, Hypothyroidism, and Chronic Constipation She presented with choking. Patient's caregiver stated that after the patient ate her breakfast around 6am that morning, she began choking when her home medications were being administered. During the episode, patient was noted to turn blue in the face. EMS was called and they noted an Sp02 in the 80's, so she was brought to the ED.      In the ED, patient was found to have lactic acidosis, hypotensive with a low of 70/55, hypoxic with a low of 88%, and tachycardic to 103. She also had a CXR showing bibasilar opacities with possible small pleural effusions. They started her on Sepsis protocol by giving fluids and starting Clindamycin. She was also placed on 4LNC. Her hospitalization was complicated by lethargy likely due to home gabapentin dose the night before.  Caregiver was called and she stated that patient has had previous episodes when she was very lethargic after taking her Gabapentin. She also stated that the patient's psychiatrist is adjusting her medications so that she would not have this symptom. After this event, patient had an overnight oxygen test done, which qualified her for nightly 2L oxygen to keep her oxygen saturation above 88% while sleeping. Patient also had an elevated temperature of 100.8 on the 7th day. She was found to have a UTI, and she was started on Rocephin. Prior to discharge, her temperature had resolved, she tolerated her meals without aspiration, and her vital signs were stable. She improved significantly and was back to her baseline, so she was discharged back to her group home.        Summarized key findings and results (labs, imaging studies, ECHO, cardiac cath, endoscopies, etc):    CBC w/Diff    Lab Results   Component Value Date/Time    WBC 10.3 12/06/2018 02:15 AM    Hemoglobin (POC) 14.6 07/08/2010 01:32 PM    HGB 12.6 12/06/2018 02:15 AM    Hematocrit (POC) 43 07/08/2010 01:32 PM    HCT 39.3 12/06/2018 02:15 AM    PLATELET 739 45/31/1357 02:15 AM    MCV 93.6 12/06/2018 02:15 AM           Chemistry    Lab Results   Component Value Date/Time    Sodium 141 12/06/2018 02:15 AM    Potassium 4.1 12/06/2018 02:15 AM    Chloride 106 12/06/2018 02:15 AM    CO2 26 12/06/2018 02:15 AM    Anion gap 9 12/06/2018 02:15 AM    Glucose 103 (H) 12/06/2018 02:15 AM    BUN 23 (H) 12/06/2018 02:15 AM    Creatinine 0.57 (L) 12/06/2018 02:15 AM    BUN/Creatinine ratio 40 (H) 12/06/2018 02:15 AM    GFR est AA >60 12/06/2018 02:15 AM    GFR est non-AA >60 12/06/2018 02:15 AM    Calcium 9.5 12/06/2018 02:15 AM           Functional status and cognitive function:    Ambulates with: Unable to ambulate  Status: alert, cooperative, no distress, appears stated age    Diet:General Diet    Code status and advanced care plan: Full   Point of Contact: Vinicio Hernandez [phone] (Relationship: Supervisor at Group)       Patient Education:  Patient was educated on the following topics prior to discharge: Gave supervisor info on oxygen use at night and aspiration pneumonia    Follow-up:   Follow-up Information     Follow up With Specialties Details Why Contact Info    Samara Montgomery Physician Assistant On 12/12/2018 Apt with Dequan Carlson at [facility] on 12/12/18 at 1:00PM [address]  [phone]              ================================================================    Ruby Garcia MD, PGY-1  [facility]  12/09/18 8:42 AM

## 2018-12-08 LAB
BACTERIA SPEC CULT: ABNORMAL
SERVICE CMNT-IMP: ABNORMAL

## 2018-12-10 LAB
BACTERIA SPEC CULT: NORMAL
BACTERIA SPEC CULT: NORMAL
SERVICE CMNT-IMP: NORMAL
SERVICE CMNT-IMP: NORMAL

## 2018-12-11 ENCOUNTER — PATIENT OUTREACH (OUTPATIENT)
Dept: FAMILY MEDICINE CLINIC | Age: 63
End: 2018-12-11

## 2018-12-11 NOTE — PROGRESS NOTES
I contacted Dr. Luan Fofana on 12/10/18, he agreed to put in new order for patient's O2. I received the order and faxed it to Jasmin Ca at the group home notified. Fax # [phone]

## 2019-01-07 ENCOUNTER — PATIENT OUTREACH (OUTPATIENT)
Dept: CARDIOLOGY CLINIC | Age: 64
End: 2019-01-07

## 2019-01-07 NOTE — PROGRESS NOTES
Patient has graduated from the Transitions of Care Coordination  program on 1/7/19. Patient's symptoms are stable at this time. Patient/family has the ability to self-manage. Care management goals have been completed at this time. No further nurse navigator follow up scheduled. Goals Addressed This Visit's Progress  COMPLETED: Instruct on red flags (ie. fever, increased productive cough, SOB, and chest pain) and when to seek urgent medical treatment. Facility will contact pcp or call 911 if symptoms worsens.  COMPLETED: Knowledge and adherence to medication (ie. action, side effects, missed dose, use of devices) Patient will complete abt therapy as ordered Pt has nurse navigator's contact information for any further questions, concerns, or needs. Patients upcoming visits:  No future appointments.

## 2019-04-05 ENCOUNTER — HOSPITAL ENCOUNTER (OUTPATIENT)
Dept: GENERAL RADIOLOGY | Age: 64
Discharge: HOME | End: 2019-04-05
Payer: MEDICARE

## 2019-04-05 DIAGNOSIS — R05.9 COUGH: ICD-10-CM

## 2019-04-05 DIAGNOSIS — J18.9 UNRESOLVED PNEUMONIA: ICD-10-CM

## 2019-04-05 PROCEDURE — 71046 X-RAY EXAM CHEST 2 VIEWS: CPT

## 2019-05-16 ENCOUNTER — HOSPITAL ENCOUNTER (OUTPATIENT)
Dept: NON INVASIVE DIAGNOSTICS | Age: 64
Discharge: HOME | End: 2019-05-16
Attending: FAMILY MEDICINE
Payer: MEDICARE

## 2019-05-16 VITALS
SYSTOLIC BLOOD PRESSURE: 116 MMHG | HEIGHT: 59 IN | WEIGHT: 187 LBS | DIASTOLIC BLOOD PRESSURE: 72 MMHG | BODY MASS INDEX: 37.7 KG/M2

## 2019-05-16 DIAGNOSIS — R06.02 SHORTNESS OF BREATH: ICD-10-CM

## 2019-05-16 LAB
ECHO AO ROOT DIAM: 2.99 CM
ECHO LV INTERNAL DIMENSION DIASTOLIC: 4.71 CM (ref 3.9–5.3)
ECHO LV INTERNAL DIMENSION SYSTOLIC: 3.13 CM
ECHO LV IVSD: 1 CM (ref 0.6–0.9)
ECHO LV MASS 2D: 180.9 G (ref 67–162)
ECHO LV MASS INDEX 2D: 100.9 G/M2 (ref 43–95)
ECHO LV POSTERIOR WALL DIASTOLIC: 0.92 CM (ref 0.6–0.9)
ECHO LVOT DIAM: 1.87 CM

## 2019-05-16 PROCEDURE — 93306 TTE W/DOPPLER COMPLETE: CPT

## 2019-08-27 ENCOUNTER — HOSPITAL ENCOUNTER (OUTPATIENT)
Dept: GENERAL RADIOLOGY | Age: 64
Discharge: HOME | End: 2019-08-27
Payer: MEDICARE

## 2019-08-27 DIAGNOSIS — R14.0 GASTRIC TYMPANY: ICD-10-CM

## 2019-08-27 DIAGNOSIS — R63.5 ABNORMAL WEIGHT GAIN: ICD-10-CM

## 2019-08-27 PROCEDURE — 74018 RADEX ABDOMEN 1 VIEW: CPT

## 2019-09-09 ENCOUNTER — HOSPITAL ENCOUNTER (OUTPATIENT)
Dept: CT IMAGING | Age: 64
Discharge: HOME | End: 2019-09-09
Attending: FAMILY MEDICINE
Payer: MEDICARE

## 2019-09-09 DIAGNOSIS — K31.89: ICD-10-CM

## 2019-09-09 LAB — CREAT UR-MCNC: 0.4 MG/DL (ref 0.6–1.3)

## 2019-09-09 PROCEDURE — 74177 CT ABD & PELVIS W/CONTRAST: CPT

## 2019-09-09 PROCEDURE — 82565 ASSAY OF CREATININE: CPT

## 2019-09-09 PROCEDURE — 74011636320 HC RX REV CODE- 636/320: Performed by: FAMILY MEDICINE

## 2019-09-09 PROCEDURE — 74011000255 HC RX REV CODE- 255: Performed by: FAMILY MEDICINE

## 2019-09-09 RX ORDER — BARIUM SULFATE 20 MG/ML
450 SUSPENSION ORAL
Status: COMPLETED | OUTPATIENT
Start: 2019-09-09 | End: 2019-09-09

## 2019-09-09 RX ADMIN — BARIUM SULFATE 450 ML: 21 SUSPENSION ORAL at 10:00

## 2019-09-09 RX ADMIN — IOPAMIDOL 100 ML: 612 INJECTION, SOLUTION INTRAVENOUS at 10:00

## 2020-09-14 PROBLEM — S43.006A SHOULDER DISLOCATION: Status: ACTIVE | Noted: 2020-09-14

## 2020-09-14 PROBLEM — A41.89 SEPSIS DUE TO OTHER ETIOLOGY (HCC): Status: ACTIVE | Noted: 2020-09-14

## 2020-09-14 PROBLEM — T68.XXXA HYPOTHERMIA: Status: ACTIVE | Noted: 2020-09-14

## 2020-09-14 PROBLEM — E83.52 HYPERCALCEMIA: Status: ACTIVE | Noted: 2020-09-14

## 2020-10-20 PROBLEM — E87.20 LACTIC ACID ACIDOSIS: Status: ACTIVE | Noted: 2020-10-20

## 2020-10-20 PROBLEM — R41.82 ALTERED MENTAL STATUS: Status: ACTIVE | Noted: 2020-10-20

## 2020-11-23 PROBLEM — I95.9 HYPOTENSION: Status: ACTIVE | Noted: 2020-11-23

## 2020-11-23 PROBLEM — J96.90 RESPIRATORY FAILURE (HCC): Status: ACTIVE | Noted: 2020-11-23

## 2020-11-23 PROBLEM — I50.9 CONGESTIVE HEART FAILURE (CHF) (HCC): Status: ACTIVE | Noted: 2020-11-23

## 2020-11-23 PROBLEM — N17.9 ACUTE KIDNEY INJURY (HCC): Status: ACTIVE | Noted: 2020-11-23

## 2021-02-28 PROBLEM — J18.9 RIGHT LOWER LOBE PNEUMONIA: Status: ACTIVE | Noted: 2021-02-28

## 2021-02-28 PROBLEM — A41.9 SEPTIC SHOCK (HCC): Status: ACTIVE | Noted: 2021-02-28

## 2021-02-28 PROBLEM — R65.21 SEPTIC SHOCK (HCC): Status: ACTIVE | Noted: 2021-02-28

## 2021-04-06 PROBLEM — E87.1 HYPONATREMIA: Status: ACTIVE | Noted: 2021-04-06

## 2021-04-06 PROBLEM — E87.8 HYPOCHLOREMIA: Status: ACTIVE | Noted: 2021-04-06

## 2021-04-06 PROBLEM — E87.5 HYPERKALEMIA: Status: ACTIVE | Noted: 2021-04-06

## 2021-04-06 PROBLEM — A41.9 SEPSIS (HCC): Status: ACTIVE | Noted: 2021-04-06

## 2021-04-06 PROBLEM — J96.21 ACUTE ON CHRONIC RESPIRATORY FAILURE WITH HYPOXEMIA (HCC): Status: ACTIVE | Noted: 2021-04-06

## 2022-03-18 PROBLEM — J69.0 ASPIRATION PNEUMONIA (HCC): Status: ACTIVE | Noted: 2018-11-27

## 2022-03-18 PROBLEM — N17.9 ACUTE KIDNEY INJURY (HCC): Status: ACTIVE | Noted: 2020-11-23

## 2022-03-18 PROBLEM — J96.21 ACUTE ON CHRONIC RESPIRATORY FAILURE WITH HYPOXEMIA (HCC): Status: ACTIVE | Noted: 2021-04-06

## 2022-03-18 PROBLEM — R41.82 ALTERED MENTAL STATUS: Status: ACTIVE | Noted: 2020-10-20

## 2022-03-18 PROBLEM — G80.9 CEREBRAL PALSY (HCC): Status: ACTIVE | Noted: 2018-11-27

## 2022-03-18 PROBLEM — A41.89 SEPSIS DUE TO OTHER ETIOLOGY (HCC): Status: ACTIVE | Noted: 2020-09-14

## 2022-03-18 PROBLEM — R09.02 HYPOXIA: Status: ACTIVE | Noted: 2018-11-27

## 2022-03-18 PROBLEM — T68.XXXA HYPOTHERMIA: Status: ACTIVE | Noted: 2020-09-14

## 2022-03-18 PROBLEM — S43.006A SHOULDER DISLOCATION: Status: ACTIVE | Noted: 2020-09-14

## 2022-03-19 PROBLEM — E83.52 HYPERCALCEMIA: Status: ACTIVE | Noted: 2020-09-14

## 2022-03-19 PROBLEM — A41.9 SEPSIS (HCC): Status: ACTIVE | Noted: 2021-04-06

## 2022-03-19 PROBLEM — J96.90 RESPIRATORY FAILURE (HCC): Status: ACTIVE | Noted: 2020-11-23

## 2022-03-19 PROBLEM — E87.1 HYPONATREMIA: Status: ACTIVE | Noted: 2021-04-06

## 2022-03-19 PROBLEM — E87.8 HYPOCHLOREMIA: Status: ACTIVE | Noted: 2021-04-06

## 2022-03-19 PROBLEM — E87.5 HYPERKALEMIA: Status: ACTIVE | Noted: 2021-04-06

## 2022-03-19 PROBLEM — A41.9 SEPTIC SHOCK (HCC): Status: ACTIVE | Noted: 2021-02-28

## 2022-03-19 PROBLEM — R65.21 SEPTIC SHOCK (HCC): Status: ACTIVE | Noted: 2021-02-28

## 2022-03-20 PROBLEM — E87.20 LACTIC ACID ACIDOSIS: Status: ACTIVE | Noted: 2020-10-20

## 2022-03-20 PROBLEM — J18.9 RIGHT LOWER LOBE PNEUMONIA: Status: ACTIVE | Noted: 2021-02-28

## 2022-03-20 PROBLEM — I95.9 HYPOTENSION: Status: ACTIVE | Noted: 2020-11-23

## 2022-03-20 PROBLEM — I50.9 CONGESTIVE HEART FAILURE (CHF) (HCC): Status: ACTIVE | Noted: 2020-11-23

## 2022-11-14 NOTE — PROGRESS NOTES
Nutrition: 
Nutrition risk referral received from RN screen. Caregiver denies pt having any unintentional weight loss prior to admission and denies any change in po intake due to decreased appetite. Pt assisted with meals. SLP evaluation noted. Full nutrition assessment is not indicated at this time. Will re-screen as appropriate.      
 
Anish Tucker RD, CNSC  
 Quality 130: Documentation Of Current Medications In The Medical Record: Current Medications Documented Detail Level: Zone Quality 431: Preventive Care And Screening: Unhealthy Alcohol Use - Screening: Patient not identified as an unhealthy alcohol user when screened for unhealthy alcohol use using a systematic screening method Quality 265: Biopsy Follow-Up: Biopsy results reviewed, communicated, tracked, and documented Quality 226: Preventive Care And Screening: Tobacco Use: Screening And Cessation Intervention: Patient screened for tobacco use and is an ex/non-smoker

## 2023-01-31 RX ORDER — BACLOFEN 20 MG/1
20 TABLET ORAL EVERY 8 HOURS
COMMUNITY
Start: 2020-06-02

## 2023-01-31 RX ORDER — NYSTATIN 100000 U/G
OINTMENT TOPICAL 2 TIMES DAILY PRN
COMMUNITY

## 2023-01-31 RX ORDER — IPRATROPIUM BROMIDE 21 UG/1
2 SPRAY, METERED NASAL 2 TIMES DAILY
COMMUNITY

## 2023-01-31 RX ORDER — LOPERAMIDE HCL 1 MG/7.5ML
2 SOLUTION ORAL 4 TIMES DAILY PRN
COMMUNITY
Start: 2020-10-28

## 2023-01-31 RX ORDER — DIVALPROEX SODIUM 125 MG/1
1000 CAPSULE, COATED PELLETS ORAL
COMMUNITY
Start: 2020-06-02

## 2023-01-31 RX ORDER — LEVOTHYROXINE SODIUM 0.1 MG/1
100 TABLET ORAL
COMMUNITY
Start: 2020-06-02

## 2023-01-31 RX ORDER — ACYCLOVIR 400 MG/1
400 TABLET ORAL 2 TIMES DAILY
COMMUNITY

## 2023-01-31 RX ORDER — ALBUTEROL SULFATE 2.5 MG/3ML
2.5 SOLUTION RESPIRATORY (INHALATION) EVERY 4 HOURS PRN
COMMUNITY

## 2023-01-31 RX ORDER — LORAZEPAM 0.5 MG/1
0.5 TABLET ORAL 4 TIMES DAILY PRN
COMMUNITY

## 2023-01-31 RX ORDER — POLYETHYLENE GLYCOL 3350 17 G/17G
17 POWDER, FOR SOLUTION ORAL DAILY
COMMUNITY
Start: 2020-10-28

## 2023-01-31 RX ORDER — GUAIFENESIN DEXTROMETHORPHAN HYDROBROMIDE ORAL SOLUTION 10; 100 MG/5ML; MG/5ML
10 SOLUTION ORAL EVERY 4 HOURS PRN
COMMUNITY

## 2023-01-31 RX ORDER — METRONIDAZOLE 7.5 MG/G
1 GEL TOPICAL DAILY
COMMUNITY
Start: 2020-06-02

## 2023-01-31 RX ORDER — LEVETIRACETAM 100 MG/ML
1000 SOLUTION ORAL 2 TIMES DAILY
COMMUNITY
Start: 2021-04-19

## 2023-01-31 RX ORDER — GABAPENTIN 100 MG/1
200 CAPSULE ORAL 4 TIMES DAILY
COMMUNITY

## 2023-01-31 RX ORDER — SCOLOPAMINE TRANSDERMAL SYSTEM 1 MG/1
1 PATCH, EXTENDED RELEASE TRANSDERMAL
COMMUNITY
Start: 2021-04-19

## 2023-01-31 RX ORDER — PSEUDOEPHEDRINE HCL 30 MG
30 TABLET ORAL EVERY 8 HOURS PRN
COMMUNITY

## 2023-01-31 RX ORDER — LORATADINE 10 MG/1
10 TABLET ORAL DAILY
COMMUNITY
Start: 2020-06-02

## 2023-01-31 RX ORDER — BISACODYL 10 MG
10 SUPPOSITORY, RECTAL RECTAL DAILY PRN
COMMUNITY

## 2023-01-31 RX ORDER — LISINOPRIL 10 MG/1
10 TABLET ORAL DAILY
COMMUNITY
Start: 2021-04-19